# Patient Record
Sex: FEMALE | Race: WHITE | Employment: FULL TIME | URBAN - METROPOLITAN AREA
[De-identification: names, ages, dates, MRNs, and addresses within clinical notes are randomized per-mention and may not be internally consistent; named-entity substitution may affect disease eponyms.]

---

## 2019-10-21 ENCOUNTER — OFFICE VISIT (OUTPATIENT)
Dept: FAMILY MEDICINE CLINIC | Age: 39
End: 2019-10-21

## 2019-10-21 VITALS
BODY MASS INDEX: 57.52 KG/M2 | DIASTOLIC BLOOD PRESSURE: 87 MMHG | RESPIRATION RATE: 20 BRPM | TEMPERATURE: 98.5 F | HEIGHT: 60 IN | SYSTOLIC BLOOD PRESSURE: 135 MMHG | HEART RATE: 102 BPM | OXYGEN SATURATION: 96 % | WEIGHT: 293 LBS

## 2019-10-21 DIAGNOSIS — M62.838 MUSCLE SPASM OF LEFT SHOULDER: ICD-10-CM

## 2019-10-21 DIAGNOSIS — Z76.89 ENCOUNTER TO ESTABLISH CARE: Primary | ICD-10-CM

## 2019-10-21 RX ORDER — ALBUTEROL SULFATE 90 UG/1
AEROSOL, METERED RESPIRATORY (INHALATION)
COMMUNITY
End: 2021-11-04 | Stop reason: ALTCHOICE

## 2019-10-21 RX ORDER — BENZONATATE 100 MG/1
CAPSULE ORAL
COMMUNITY
Start: 2019-10-19 | End: 2021-11-04 | Stop reason: ALTCHOICE

## 2019-10-21 RX ORDER — IBUPROFEN 600 MG/1
TABLET ORAL
COMMUNITY
Start: 2019-10-19 | End: 2019-10-21 | Stop reason: ALTCHOICE

## 2019-10-21 RX ORDER — CYCLOBENZAPRINE HCL 10 MG
10 TABLET ORAL
Qty: 20 TAB | Refills: 0 | Status: SHIPPED | OUTPATIENT
Start: 2019-10-21 | End: 2021-11-04 | Stop reason: ALTCHOICE

## 2019-10-21 NOTE — PROGRESS NOTES
Assessment/Plan:     Diagnoses and all orders for this visit:    1. Encounter to establish care   -schedule CPE in the next several weeks. 2. Muscle spasm of left shoulder  -     REFERRAL TO PHYSICAL THERAPY  -     cyclobenzaprine (FLEXERIL) 10 mg tablet; Take 1 Tab by mouth three (3) times daily as needed for Muscle Spasm(s). - Unchanged, start Flexeril today as discussed, may try ibuprofen, referral to PT.  RTC if symptoms do not begin to improve. Discussed expected course/resolution/complications of diagnosis in detail with patient. Medication risks/benefits/costs/interactions/alternatives discussed with patient. Pt was given after visit summary which includes diagnoses, current medications & vitals. Pt expressed understanding with the diagnosis and plan        Subjective:      Mandy Fuentes is a 45 y.o. female who presents for had concerns including Establish Care. Here today to establish care. Neck Pain  Patient complains of neck pain. Onset of symptoms was 4 months ago, gradually worsening since that time. Current symptoms are numbness in left arm when having neck pain. Patient denies numbness, tingling, paresthesias in upper extremities. Patient denies weakness, diminished  strength, lack of coordination. Radiation of pain:  Event that precipitate these symptoms: none known. Patient has had no prior neck problems. Previous treatments include: none. Current Outpatient Medications   Medication Sig Dispense Refill    benzonatate (TESSALON) 100 mg capsule       albuterol (PROVENTIL HFA, VENTOLIN HFA, PROAIR HFA) 90 mcg/actuation inhaler Take  by inhalation.  cyclobenzaprine (FLEXERIL) 10 mg tablet Take 1 Tab by mouth three (3) times daily as needed for Muscle Spasm(s).  20 Tab 0       Allergies   Allergen Reactions    Pcn [Penicillins] Hives     Past Medical History:   Diagnosis Date    Kidney calculi      Past Surgical History:   Procedure Laterality Date    CYSTOSCOPY,INSERT URETERAL STENT  6.23.16    Dr. Jose Dial ([facility])     Family History   Problem Relation Age of Onset    Cancer Mother         Leukemia    Cancer Maternal Grandmother 80        colon cancer    Cancer Maternal Uncle         Lung Cancer     Social History     Socioeconomic History    Marital status: SINGLE     Spouse name: Not on file    Number of children: 0    Years of education: Not on file    Highest education level: Not on file   Occupational History    Occupation: Security   Social Needs    Financial resource strain: Not on file    Food insecurity:     Worry: Not on file     Inability: Not on file   OM[facility] needs:     Medical: Not on file     Non-medical: Not on file   Tobacco Use    Smoking status: Never Smoker    Smokeless tobacco: Never Used    Tobacco comment: boyfriend smokes in apartment   Substance and Sexual Activity    Alcohol use: No     Alcohol/week: 0.0 standard drinks    Drug use: No    Sexual activity: Yes     Partners: Male     Birth control/protection: None   Lifestyle    Physical activity:     Days per week: Not on file     Minutes per session: Not on file    Stress: Not on file   Relationships    Social connections:     Talks on phone: Not on file     Gets together: Not on file     Attends Judaism service: Not on file     Active member of club or organization: Not on file     Attends meetings of clubs or organizations: Not on file     Relationship status: Not on file    Intimate partner violence:     Fear of current or ex partner: Not on file     Emotionally abused: Not on file     Physically abused: Not on file     Forced sexual activity: Not on file   Other Topics Concern     Service Not Asked    Blood Transfusions Not Asked    Caffeine Concern Not Asked    Occupational Exposure Not Asked   Rosibel Blizzard Hazards Not Asked    Sleep Concern Not Asked    Stress Concern Not Asked    Weight Concern Not Asked    Special Diet Not Asked    Back Care Not Asked    Exercise No    Bike Helmet Not Asked   [address] [facility] Not Asked    Self-Exams Not Asked   Social History Narrative    Lives with boyfriend (12 years in 2016). No pets. HPI      ROS:   Review of Systems   Constitutional: Negative for chills, fever and malaise/fatigue. Respiratory: Positive for cough. Negative for shortness of breath. Cardiovascular: Negative for chest pain, palpitations and leg swelling. Musculoskeletal: Positive for neck pain. Objective:     Visit Vitals  /87 (BP 1 Location: Left arm, BP Patient Position: Sitting)   Pulse (!) 102   Temp 98.5 °F (36.9 °C) (Oral)   Resp 20   Ht 5' (1.524 m)   Wt 303 lb (137.4 kg)   LMP 10/04/2019   SpO2 96%   BMI 59.18 kg/m²         Vitals and Nurse Documentation reviewed. Physical Exam   Constitutional: She is oriented to person, place, and time and well-developed, well-nourished, and in no distress. Vital signs are normal. No distress. HENT:   Head: Normocephalic and atraumatic. Cardiovascular: Normal rate, regular rhythm and normal heart sounds. Exam reveals no gallop and no friction rub. No murmur heard. Pulmonary/Chest: Effort normal and breath sounds normal. No respiratory distress. She has no wheezes. She has no rales. Musculoskeletal:        Cervical back: She exhibits tenderness. Mild tenderness over left trapizeus area, mild decreased ROM due to pain. No swelling or erythema, weakness, no sensation deficit. Neurological: She is alert and oriented to person, place, and time. Skin: Skin is warm, dry and intact. She is not diaphoretic. No cyanosis. No pallor. Psychiatric: Affect normal. Her mood appears not anxious. She is not agitated. She does not exhibit a depressed mood. She expresses no homicidal and no suicidal ideation.        Results for orders placed or performed during the hospital encounter of 06/18/16   CULTURE, URINE   Result Value Ref Range    Special Requests: NO SPECIAL REQUESTS      [place] Count >100,000  COLONIES/mL        Culture result: ESCHERICHIA COLI         Susceptibility    Escherichia coli - AGUSTINA     Amikacin ($) <=16 Susceptible ug/mL     Ampicillin ($) >16 Resistant ug/mL     Ampicillin/sulbactam ($) <=8/4 Susceptible ug/mL     Aztreonam ($$$$) <=4 Susceptible ug/mL     Cefazolin ($) <=8 Susceptible ug/mL     Cefepime ($$) <=4 Susceptible ug/mL     Cefotaxime <=2 Susceptible ug/mL     Ceftazidime ($) <=1 Susceptible ug/mL     Ceftriaxone ($) <=1 Susceptible ug/mL     Cefuroxime ($) <=4 Susceptible ug/mL     Ciprofloxacin ($) <=1 Susceptible ug/mL     Gentamicin ($) <=4 Susceptible ug/mL     Imipenem <=1 Susceptible ug/mL     Levofloxacin ($) <=2 Susceptible ug/mL     Meropenem ($$) <=1 Susceptible ug/mL     Nitrofurantoin <=32 Susceptible ug/mL     Piperacillin/Tazobac ($) <=16 Susceptible ug/mL     Tobramycin ($) <=4 Susceptible ug/mL     Trimeth-Sulfamethoxa <=2/38 Susceptible ug/mL   CBC WITH AUTOMATED DIFF   Result Value Ref Range    WBC 13.8 (H) 3.6 - 11.0 K/uL    RBC 4.77 3.80 - 5.20 M/uL    HGB 14.3 11.5 - 16.0 g/dL    HCT 43.1 35.0 - 47.0 %    MCV 90.4 80.0 - 99.0 FL    MCH 30.0 26.0 - 34.0 PG    MCHC 33.2 30.0 - 36.5 g/dL    RDW 12.6 11.5 - 14.5 %    PLATELET 006 545 - 787 K/uL    NEUTROPHILS 75 32 - 75 %    LYMPHOCYTES 19 12 - 49 %    MONOCYTES 5 5 - 13 %    EOSINOPHILS 1 0 - 7 %    BASOPHILS 0 0 - 1 %    ABS. NEUTROPHILS 10.3 (H) 1.8 - 8.0 K/UL    ABS. LYMPHOCYTES 2.7 0.8 - 3.5 K/UL    ABS. MONOCYTES 0.7 0.0 - 1.0 K/UL    ABS. EOSINOPHILS 0.1 0.0 - 0.4 K/UL    ABS.  BASOPHILS 0.0 0.0 - 0.1 K/UL   METABOLIC PANEL, COMPREHENSIVE   Result Value Ref Range    Sodium 140 136 - 145 mmol/L    Potassium 3.4 (L) 3.5 - 5.1 mmol/L    Chloride 104 97 - 108 mmol/L    CO2 27 21 - 32 mmol/L    Anion gap 9 5 - 15 mmol/L    Glucose 112 (H) 65 - 100 mg/dL    BUN 12 6 - 20 MG/DL    Creatinine 0.72 0.55 - 1.02 MG/DL    BUN/Creatinine ratio 17 12 - 20      GFR est AA >60 >60 ml/min/1.73m2    GFR est non-AA >60 >60 ml/min/1.73m2    Calcium 9.2 8.5 - 10.1 MG/DL    Bilirubin, total 0.4 0.2 - 1.0 MG/DL    ALT (SGPT) 25 12 - 78 U/L    AST (SGOT) 12 (L) 15 - 37 U/L    Alk.  phosphatase 95 45 - 117 U/L    Protein, total 8.7 (H) 6.4 - 8.2 g/dL    Albumin 4.0 3.5 - 5.0 g/dL    Globulin 4.7 (H) 2.0 - 4.0 g/dL    A-G Ratio 0.9 (L) 1.1 - 2.2     URINALYSIS W/ REFLEX CULTURE   Result Value Ref Range    Color YELLOW/STRAW      Appearance CLOUDY (A) CLEAR      Specific gravity 1.014 1.003 - 1.030      pH (UA) 6.0 5.0 - 8.0      Protein TRACE (A) NEG mg/dL    Glucose NEGATIVE  NEG mg/dL    Ketone NEGATIVE  NEG mg/dL    Bilirubin NEGATIVE  NEG      Blood LARGE (A) NEG      Urobilinogen 0.2 0.2 - 1.0 EU/dL    Nitrites NEGATIVE  NEG      Leukocyte Esterase SMALL (A) NEG      WBC 10-20 0 - 4 /hpf    RBC 20-50 0 - 5 /hpf    Epithelial cells MANY (A) FEW /lpf    Bacteria 4+ (A) NEG /hpf    UA:UC IF INDICATED URINE CULTURE ORDERED (A) CNI     LIPASE   Result Value Ref Range    Lipase 60 (L) 73 - 393 U/L   HCG URINE, QL. - POC   Result Value Ref Range    Pregnancy test,urine (POC) NEGATIVE  NEG

## 2021-11-04 ENCOUNTER — OFFICE VISIT (OUTPATIENT)
Dept: FAMILY MEDICINE CLINIC | Age: 41
End: 2021-11-04
Payer: COMMERCIAL

## 2021-11-04 VITALS
HEART RATE: 82 BPM | DIASTOLIC BLOOD PRESSURE: 90 MMHG | RESPIRATION RATE: 16 BRPM | TEMPERATURE: 97.3 F | SYSTOLIC BLOOD PRESSURE: 140 MMHG | OXYGEN SATURATION: 96 % | BODY MASS INDEX: 54.06 KG/M2 | HEIGHT: 60 IN | WEIGHT: 275.38 LBS

## 2021-11-04 DIAGNOSIS — Z12.31 ENCOUNTER FOR SCREENING MAMMOGRAM FOR MALIGNANT NEOPLASM OF BREAST: ICD-10-CM

## 2021-11-04 DIAGNOSIS — Z76.89 ENCOUNTER TO ESTABLISH CARE: ICD-10-CM

## 2021-11-04 DIAGNOSIS — Z28.20 VACCINE REFUSED BY PATIENT: ICD-10-CM

## 2021-11-04 DIAGNOSIS — R13.19 ESOPHAGEAL DYSPHAGIA: ICD-10-CM

## 2021-11-04 DIAGNOSIS — R03.0 ELEVATED BLOOD PRESSURE READING WITHOUT DIAGNOSIS OF HYPERTENSION: ICD-10-CM

## 2021-11-04 DIAGNOSIS — E66.01 MORBID OBESITY DUE TO EXCESS CALORIES (HCC): ICD-10-CM

## 2021-11-04 DIAGNOSIS — K21.9 GASTROESOPHAGEAL REFLUX DISEASE, UNSPECIFIED WHETHER ESOPHAGITIS PRESENT: Primary | ICD-10-CM

## 2021-11-04 PROCEDURE — 99204 OFFICE O/P NEW MOD 45 MIN: CPT | Performed by: NURSE PRACTITIONER

## 2021-11-04 RX ORDER — PANTOPRAZOLE SODIUM 40 MG/1
40 TABLET, DELAYED RELEASE ORAL DAILY
Qty: 90 TABLET | Refills: 0 | Status: SHIPPED | OUTPATIENT
Start: 2021-11-04 | End: 2022-01-05 | Stop reason: SDUPTHER

## 2021-11-04 NOTE — PROGRESS NOTES
Chief Complaint   Patient presents with    Other     throat/swallowing issue   1. Have you been to the ER, urgent care clinic since your last visit? Hospitalized since your last visit? Yes Reason for visit: CJW, bronchitis 2019    2. Have you seen or consulted any other health care providers outside of the [address] [place] [address] since your last visit? Include any pap smears or colon screening.  No   3 most recent PHQ Screens 11/4/2021   Little interest or pleasure in doing things Not at all   Feeling down, depressed, irritable, or hopeless Not at all   Total Score PHQ 2 0     Visit Vitals  BP (!) 140/90 (BP 1 Location: Right upper arm, BP Patient Position: Sitting)   Pulse 82   Temp 97.3 °F (36.3 °C) (Temporal)   Resp 16   Ht 5' (1.524 m)   Wt 275 lb 6 oz (124.9 kg)   SpO2 96%   BMI 53.78 kg/m²

## 2021-11-04 NOTE — PROGRESS NOTES
Subjective  Chief Complaint   Patient presents with    Other     throat/swallowing issue     HPI:  Selina Bills is a 36 y.o. female. This is a new patient to the practice. Presents to discuss gag reflux. Reports sensitive gag reflux in the past. Now reports difficulty swallowing. Symptoms include sensation of ball in her throat, cough with eating, food stuck in esophagus, reflux, regurgitation, and heartburn. High fat and greasy foods are triggers for reflux and heartburn. OTC Prilosec daily for 2 weeks did not improve symptoms. Follows with health and , Sandrine Partida, who is prescribing progesterone for irregular cycles, and helping with weight loss. Weight is down 35 pounds over the past year. Goal weight is under 200. Exercises at least 3 days per week with  and walks daily during lunch break.     Past Medical History:   Diagnosis Date    Elevated blood pressure reading     High cholesterol     Irregular periods/menstrual cycles     Kidney stone      Family History   Problem Relation Age of Onset    Cancer Mother         Leukemia    High Cholesterol Father     Cancer Maternal Grandmother 80        colon cancer    Cancer Maternal Uncle         Lung Cancer    High Cholesterol Brother     COPD Paternal Grandfather     Alzheimer's Disease Maternal Grandfather      Social History     Socioeconomic History    Marital status: SINGLE     Spouse name: Not on file    Number of children: 0    Years of education: Not on file    Highest education level: Not on file   Occupational History    Occupation: Security   Tobacco Use    Smoking status: Never Smoker    Smokeless tobacco: Never Used    Tobacco comment: boyfriend smokes in apartment   Vaping Use    Vaping Use: Never used   Substance and Sexual Activity    Alcohol use: Yes     Comment: rarely    Drug use: No    Sexual activity: Yes     Partners: Male     Birth control/protection: None   Other Topics Concern     Service Not Asked    Blood Transfusions Not Asked    Caffeine Concern Not Asked    Occupational Exposure Not Asked    Hobby Hazards Not Asked    Sleep Concern Not Asked    Stress Concern Not Asked    Weight Concern Not Asked    Special Diet Not Asked    Back Care Not Asked    Exercise No    Bike Helmet Not Asked   [address] Not Asked    Self-Exams Not Asked   Social History Narrative    Lives with boyfriend (12 years in 2016). No pets. Social Determinants of Health     Financial Resource Strain:     Difficulty of Paying Living Expenses: Not on file   Food Insecurity:     Worried About Running Out of Food in the Last Year: Not on file    Brielle of Food in the Last Year: Not on file   Transportation Needs:     Lack of Transportation (Medical): Not on file    Lack of Transportation (Non-Medical): Not on file   Physical Activity:     Days of Exercise per Week: Not on file    Minutes of Exercise per Session: Not on file   Stress:     Feeling of Stress : Not on file   Social Connections:     Frequency of Communication with Friends and Family: Not on file    Frequency of Social Gatherings with Friends and Family: Not on file    Attends Denominational Services: Not on file    Active Member of [address] [place] [address] [email or web address] or Organizations: Not on file    Attends Club or Organization Meetings: Not on file    Marital Status: Not on file   Intimate Partner Violence:     Fear of Current or Ex-Partner: Not on file    Emotionally Abused: Not on file    Physically Abused: Not on file    Sexually Abused: Not on file   Housing Stability:     Unable to Pay for Housing in the Last Year: Not on file    Number of [place] in the Last Year: Not on file    Unstable Housing in the Last Year: Not on file     Current Outpatient Medications on File Prior to Visit   Medication Sig Dispense Refill    PROGESTERONE 250 Capsules by Does Not Apply route.       [DISCONTINUED] benzonatate (TESSALON) 100 mg capsule (Patient not taking: Reported on 11/4/2021)      [DISCONTINUED] albuterol (PROVENTIL HFA, VENTOLIN HFA, PROAIR HFA) 90 mcg/actuation inhaler Take  by inhalation. (Patient not taking: Reported on 11/4/2021)      [DISCONTINUED] cyclobenzaprine (FLEXERIL) 10 mg tablet Take 1 Tab by mouth three (3) times daily as needed for Muscle Spasm(s). (Patient not taking: Reported on 11/4/2021) 20 Tab 0     No current facility-administered medications on file prior to visit. Allergies   Allergen Reactions    Pcn [Penicillins] Hives     ROS  See HPI for pertinent ROS. Objective  Visit Vitals  BP (!) 140/90 (BP 1 Location: Right upper arm, BP Patient Position: Sitting)   Pulse 82   Temp 97.3 °F (36.3 °C) (Temporal)   Resp 16   Ht 5' (1.524 m)   Wt 275 lb 6 oz (124.9 kg)   SpO2 96%   BMI 53.78 kg/m²       Physical Exam  Vitals and nursing note reviewed. Constitutional:       General: She is not in acute distress. Appearance: Normal appearance. HENT:      Head: Normocephalic. Eyes:      Extraocular Movements: Extraocular movements intact. Cardiovascular:      Rate and Rhythm: Normal rate and regular rhythm. Heart sounds: Normal heart sounds. Pulmonary:      Effort: Pulmonary effort is normal.      Breath sounds: Normal breath sounds. Abdominal:      General: Bowel sounds are normal.      Palpations: Abdomen is soft. Tenderness: There is abdominal tenderness in the epigastric area. Musculoskeletal:         General: Normal range of motion. Right lower leg: No edema. Left lower leg: No edema. Skin:     General: Skin is warm and dry. Neurological:      Mental Status: She is alert and oriented to person, place, and time. Psychiatric:         Mood and Affect: Mood normal.         Behavior: Behavior normal.          Assessment & Plan      ICD-10-CM ICD-9-CM    1.  Gastroesophageal reflux disease, unspecified whether esophagitis present  K21.9 530.81 pantoprazole (PROTONIX) 40 mg tablet 2. Esophageal dysphagia  R13.19 787.29    3. Morbid obesity due to excess calories ([facility])  E66.01 278.01    4. Encounter for screening mammogram for malignant neoplasm of breast  Z12.31 V76.12 [facility] MAMMO BI SCREENING INCL CAD   5. Elevated blood pressure reading without diagnosis of hypertension  R03.0 796.2    6. Vaccine refused by patient  Z28.20 V64.09    7. Encounter to establish care  Z76.89 V65.8      Diagnoses and all orders for this visit:    1. Gastroesophageal reflux disease, unspecified whether esophagitis present  Medication as ordered-we discussed she may benefit from a longer course of higher dose of PPI. Understands we will decrease dose to 20 mg which is a more appropriate maintenance dose with the next refill if her symptoms resolved. We also reviewed pharmacological and non pharmacological management of GERD including weight loss, common triggers etc.   -     pantoprazole (PROTONIX) 40 mg tablet; Take 1 Tablet by mouth daily. 2. Esophageal dysphagia  Possibly related to uncontrolled reflux. She declines GI referral today but is agreeable if symptoms do not resolve over the next 1-2 months. Encouraged to eat smaller bites of food, chew food thoroughly, and takes sips of water if needed while eating. 3. Morbid obesity due to excess calories ([address]  Follows with wellness and  for weight loss. Continue to work on improving diet and increasing exercise. 4. Encounter for screening mammogram for malignant neoplasm of breast  -     [facility] MAMMO BI SCREENING INCL CAD; Future    5. Elevated blood pressure reading without diagnosis of hypertension  BP is above goal in the office today. She is going to check readings outside the office after sitting quietly for 5 minutes with both feet flat on the floor and arm at heart level. Instructed to call if staying consistently greater than 140/90 on either number and bring readings to f/u appointment in two months.  In the meantime, stay hydrated, lower salt in diet and increase exercise. 6. Vaccine refused by patient  Declines flu vaccine. 7. Encounter to establish care      Follow-up and Dispositions    · Return in about 2 months (around 1/4/2022), or if symptoms worsen or fail to improve, for wellness, fasting labs, follow up 11/4/2021.            Guzman Rodriguez, NP

## 2021-11-04 NOTE — PATIENT INSTRUCTIONS
Gastroesophageal Reflux Disease (GERD): Care Instructions  Overview     Gastroesophageal reflux disease (GERD) is the backward flow of stomach acid into the esophagus. The esophagus is the tube that leads from your throat to your stomach. A one-way valve prevents the stomach acid from backing up into this tube. But when you have GERD, this valve does not close tightly enough. This can also cause pain and swelling in your esophagus. (This is called esophagitis.)  If you have mild GERD symptoms including heartburn, you may be able to control the problem with antacids or over-the-counter medicine. You can also make lifestyle changes to help reduce your symptoms. These include changing your diet and eating habits, such as not eating late at night and losing weight. Follow-up care is a key part of your treatment and safety. Be sure to make and go to all appointments, and call your doctor if you are having problems. It's also a good idea to know your test results and keep a list of the medicines you take. How can you care for yourself at home? · Take your medicines exactly as prescribed. Call your doctor if you think you are having a problem with your medicine. · Your doctor may recommend over-the-counter medicine. For mild or occasional indigestion, antacids, such as Tums, Gaviscon, Mylanta, or Maalox, may help. Your doctor also may recommend over-the-counter acid reducers, such as famotidine (Pepcid AC), cimetidine (Tagamet HB), or omeprazole (Prilosec). Read and follow all instructions on the label. If you use these medicines often, talk with your doctor. · Change your eating habits. ? It's best to eat several small meals instead of two or three large meals. ? After you eat, wait 2 to 3 hours before you lie down. ? Chocolate, mint, and alcohol can make GERD worse. ? Spicy foods, foods that have a lot of acid (like tomatoes and oranges), and coffee can make GERD symptoms worse in some people.  If your symptoms are worse after you eat a certain food, you may want to stop eating that food to see if your symptoms get better. · Do not smoke or chew tobacco. Smoking can make GERD worse. If you need help quitting, talk to your doctor about stop-smoking programs and medicines. These can increase your chances of quitting for good. · If you have GERD symptoms at night, raise the head of your bed 6 to 8 inches by putting the frame on blocks or placing a foam wedge under the head of your mattress. (Adding extra pillows does not work.)  · Do not wear tight clothing around your middle. · Lose weight if you need to. Losing just 5 to 10 pounds can help. When should you call for help? Call your doctor now or seek immediate medical care if:    · You have new or different belly pain.     · Your stools are black and tarlike or have streaks of blood. Watch closely for changes in your health, and be sure to contact your doctor if:    · Your symptoms have not improved after 2 days.     · Food seems to catch in your throat or chest.   Where can you learn more? Go to http://www.gray.com/  Enter O112 in the search box to learn more about \"Gastroesophageal Reflux Disease (GERD): Care Instructions. \"  Current as of: February 10, 2021               Content Version: 13.0  © 2006-2021 [employer]. Care instructions adapted under license by [employer] (which disclaims liability or warranty for this information). If you have questions about a medical condition or this instruction, always ask your healthcare professional. [place] [address] any warranty or liability for your use of this information. Home Blood Pressure Test: About This Test  What is it? A home blood pressure test allows you to keep track of your blood pressure at home. Blood pressure is a measure of the force of blood against the walls of your arteries.  Blood pressure readings include two numbers, such as 130/80 (say \"130 over 80\"). The first number is the systolic pressure. The second number is the diastolic pressure. Why is this test done? You may do this test at home to:  · Find out if you have high blood pressure. · Track your blood pressure if you have high blood pressure. · Track how well medicine is working to reduce high blood pressure. · Check how lifestyle changes, such as weight loss and exercise, are affecting blood pressure. How do you prepare for the test?  For at least 30 minutes before you take your blood pressure, don't exercise, drink caffeine, or smoke. Empty your bladder before the test. Sit quietly with your back straight and both feet on the floor for at least 5 minutes. This helps you take your blood pressure while you feel comfortable and relaxed. How is the test done? · If your doctor recommends it, take your blood pressure twice a day. Take it in the morning and evening. · Sit with your arm slightly bent and resting on a table so that your upper arm is at the same level as your heart. · Use the same arm each time you take your blood pressure. · Place the blood pressure cuff on the bare skin of your upper arm. You may have to roll up your sleeve, remove your arm from the sleeve, or take your shirt off. · Wrap the blood pressure cuff around your upper arm so that the lower edge of the cuff is about 1 inch above the bend of your elbow. · Do not move, talk, or text while you take your blood pressure. Follow the instructions that came with your blood pressure monitor. They might be different from the following. · Press the on/off button on the automatic monitor. Then you may need to wait until the screen says the monitor is ready. · Press the start button. The cuff will inflate and deflate by itself. · Your blood pressure numbers will appear on the screen. · Wait one minute and take your blood pressure again.   · If your monitor does not automatically save your numbers, write them in your log book, along with the date and time. Follow-up care is a key part of your treatment and safety. Be sure to make and go to all appointments, and call your doctor if you are having problems. It's also a good idea to keep a list of the medicines you take. Where can you learn more? Go to http://www.gonzalez.com/  Enter C427 in the search box to learn more about \"Home Blood Pressure Test: About This Test.\"  Current as of: April 29, 2021               Content Version: 13.0  © 3025-1251 Healthwise, Incorporated. Care instructions adapted under license by [employer] (which disclaims liability or warranty for this information). If you have questions about a medical condition or this instruction, always ask your healthcare professional. [address] any warranty or liability for your use of this information.

## 2021-11-17 ENCOUNTER — HOSPITAL ENCOUNTER (OUTPATIENT)
Dept: MAMMOGRAPHY | Age: 41
Discharge: HOME | End: 2021-11-17
Payer: COMMERCIAL

## 2021-11-17 DIAGNOSIS — Z12.31 ENCOUNTER FOR SCREENING MAMMOGRAM FOR MALIGNANT NEOPLASM OF BREAST: ICD-10-CM

## 2021-11-17 PROCEDURE — 77067 SCR MAMMO BI INCL CAD: CPT

## 2022-01-05 ENCOUNTER — OFFICE VISIT (OUTPATIENT)
Dept: FAMILY MEDICINE CLINIC | Age: 42
End: 2022-01-05
Payer: COMMERCIAL

## 2022-01-05 VITALS
DIASTOLIC BLOOD PRESSURE: 94 MMHG | WEIGHT: 286.13 LBS | HEART RATE: 94 BPM | BODY MASS INDEX: 56.18 KG/M2 | OXYGEN SATURATION: 97 % | HEIGHT: 60 IN | SYSTOLIC BLOOD PRESSURE: 169 MMHG | TEMPERATURE: 97.7 F | RESPIRATION RATE: 16 BRPM

## 2022-01-05 DIAGNOSIS — Z11.59 ENCOUNTER FOR HEPATITIS C SCREENING TEST FOR LOW RISK PATIENT: ICD-10-CM

## 2022-01-05 DIAGNOSIS — Z00.00 WELLNESS EXAMINATION: Primary | ICD-10-CM

## 2022-01-05 DIAGNOSIS — F33.8 SEASONAL DEPRESSION (HCC): ICD-10-CM

## 2022-01-05 DIAGNOSIS — Z13.220 SCREENING FOR HYPERLIPIDEMIA: ICD-10-CM

## 2022-01-05 DIAGNOSIS — I10 PRIMARY HYPERTENSION: ICD-10-CM

## 2022-01-05 DIAGNOSIS — Z28.20 VACCINE REFUSED BY PATIENT: ICD-10-CM

## 2022-01-05 DIAGNOSIS — R13.14 PHARYNGOESOPHAGEAL DYSPHAGIA: ICD-10-CM

## 2022-01-05 DIAGNOSIS — K21.9 GASTROESOPHAGEAL REFLUX DISEASE, UNSPECIFIED WHETHER ESOPHAGITIS PRESENT: ICD-10-CM

## 2022-01-05 DIAGNOSIS — Z13.31 POSITIVE DEPRESSION SCREENING: ICD-10-CM

## 2022-01-05 DIAGNOSIS — E66.01 MORBID OBESITY DUE TO EXCESS CALORIES (HCC): ICD-10-CM

## 2022-01-05 PROCEDURE — 99214 OFFICE O/P EST MOD 30 MIN: CPT | Performed by: NURSE PRACTITIONER

## 2022-01-05 PROCEDURE — 99396 PREV VISIT EST AGE 40-64: CPT | Performed by: NURSE PRACTITIONER

## 2022-01-05 RX ORDER — CHLORTHALIDONE 25 MG/1
TABLET ORAL
Qty: 15 TABLET | Refills: 0 | Status: SHIPPED | OUTPATIENT
Start: 2022-01-05 | End: 2022-02-08 | Stop reason: SDUPTHER

## 2022-01-05 RX ORDER — PANTOPRAZOLE SODIUM 20 MG/1
20 TABLET, DELAYED RELEASE ORAL DAILY
Qty: 90 TABLET | Refills: 0 | Status: SHIPPED | OUTPATIENT
Start: 2022-01-05 | End: 2022-05-11 | Stop reason: ALTCHOICE

## 2022-01-05 NOTE — PROGRESS NOTES
Chief Complaint   Patient presents with   [facility] Annual Wellness Visit   1. Have you been to the ER, urgent care clinic since your last visit? Hospitalized since your last visit? No    2. Have you seen or consulted any other health care providers outside of the [address] [place] [address] since your last visit? Include any pap smears or colon screening.  No   3 most recent PHQ Screens 1/5/2022   Little interest or pleasure in doing things Several days   Feeling down, depressed, irritable, or hopeless Nearly every day   Total Score PHQ 2 4   Trouble falling or staying asleep, or sleeping too much Nearly every day   Feeling tired or having little energy Nearly every day   Poor appetite, weight loss, or overeating More than half the days   Feeling bad about yourself - or that you are a failure or have let yourself or your family down Several days   Trouble concentrating on things such as school, work, reading, or watching TV More than half the days   Moving or speaking so slowly that other people could have noticed; or the opposite being so fidgety that others notice Several days   Thoughts of being better off dead, or hurting yourself in some way Not at all   PHQ 9 Score 16   How difficult have these problems made it for you to do your work, take care of your home and get along with others Somewhat difficult     Visit Vitals  BP (!) 169/94 (BP 1 Location: Right upper arm, BP Patient Position: Sitting, BP Cuff Size: Large adult)   Pulse 94   Temp 97.7 °F (36.5 °C) (Temporal)   Resp 16   Ht 5' (1.524 m)   Wt 286 lb 2 oz (129.8 kg)   SpO2 97%   BMI 55.88 kg/m²

## 2022-01-05 NOTE — PATIENT INSTRUCTIONS
Recovering From Depression: Care Instructions  Your Care Instructions     Taking good care of yourself is important as you recover from depression. In time, your symptoms will fade as your treatment takes hold. Do not give up. Instead, focus your energy on getting better. Your mood will improve. It just takes some time. Focus on things that can help you feel better, such as being with friends and family, eating well, and getting enough rest. But take things slowly. Do not do too much too soon. You will begin to feel better gradually. Follow-up care is a key part of your treatment and safety. Be sure to make and go to all appointments, and call your doctor if you are having problems. It's also a good idea to know your test results and keep a list of the medicines you take. How can you care for yourself at home? Be realistic  · If you have a large task to do, break it up into smaller steps you can handle, and just do what you can. · You may want to put off important decisions until your depression has lifted. If you have plans that will have a major impact on your life, such as marriage, divorce, or a job change, try to wait a bit. Talk it over with friends and loved ones who can help you look at the overall picture first.  · Reaching out to people for help is important. Do not isolate yourself. Let your family and friends help you. Find someone you can trust and confide in, and talk to that person. · Be patient, and be kind to yourself. Remember that depression is not your fault and is not something you can overcome with willpower alone. Treatment is important for depression, just like for any other illness. Feeling better takes time, and your mood will improve little by little. Stay active  · Stay busy and get outside. Take a walk, or try some other light exercise. · Talk with your doctor about an exercise program. Exercise can help with mild depression. · Go to a movie or concert.  Take part in a Anglican activity or other social gathering. Go to a [employer] game. · Ask a friend to have dinner with you. Take care of yourself  · Eat a balanced diet with plenty of fresh fruits and vegetables, whole grains, and lean protein. If you have lost your appetite, eat small snacks rather than large meals. · Avoid using illegal drugs or marijuana and drinking alcohol. Do not take medicines that have not been prescribed for you. They may interfere with medicines you may be taking for depression, or they may make your depression worse. · Take your medicines exactly as they are prescribed. You may start to feel better within 1 to 3 weeks of taking antidepressant medicine. But it can take as many as 6 to 8 weeks to see more improvement. If you have questions or concerns about your medicines, or if you do not notice any improvement by 3 weeks, talk to your doctor. · Continue to take your medicine after your symptoms improve. Taking your medicine for at least 6 months after you feel better can help keep you from getting depressed again. If this isn't the first time you have been depressed, your doctor may recommend you to take medicine even longer. · If you have any side effects from your medicine, tell your doctor. Many side effects are mild and will go away on their own after you have been taking the medicine for a few weeks. Some may last longer. Talk to your doctor if side effects are bothering you too much. You might be able to try a different medicine. · Continue counseling. It may help prevent depression from returning, especially if you've had multiple episodes of depression. Talk with your counselor if you are having a hard time attending your sessions or you think the sessions aren't working. Don't just stop going. · Get enough sleep. Talk to your doctor if you are having problems sleeping. · Avoid sleeping pills unless they are prescribed by the doctor treating your depression.  Sleeping pills may make you groggy during the day, and they may interact with other medicine you are taking. · If you have any other illnesses, such as diabetes, heart disease, or high blood pressure, make sure to continue with your treatment. Tell your doctor about all of the medicines you take, including those with or without a prescription. · If you or someone you know talks about suicide, self-harm, or feeling hopeless, get help right away. Call the [address] [place] [address] at [phone]-AMDP ([phone]) or text HOME to 428960 to access the Crisis Text Line. Consider saving these numbers in your phone. When should you call for help? Call 911 anytime you think you may need emergency care. For example, call if:    · You feel like hurting yourself or someone else.     · Someone you know has depression and is about to attempt or is attempting suicide. Call your doctor now or seek immediate medical care if:    · You hear voices.     · Someone you know has depression and:  ? Starts to give away his or her possessions. ? Uses illegal drugs or drinks alcohol heavily. ? Talks or writes about death, including writing suicide notes or talking about guns, knives, or pills. ? Starts to spend a lot of time alone. ? Acts very aggressively or suddenly appears calm. Watch closely for changes in your health, and be sure to contact your doctor if:    · You do not get better as expected. Where can you learn more? Go to http://www.gray.com/  Enter N529 in the search box to learn more about \"Recovering From Depression: Care Instructions. \"  Current as of: June 16, 2021               Content Version: 13.0  © 2384-3535 Healthwise, Incorporated. Care instructions adapted under license by [employer] (which disclaims liability or warranty for this information).  If you have questions about a medical condition or this instruction, always ask your healthcare professional. [address] any warranty or liability for your use of this information. Well Visit, Ages 25 to 48: Care Instructions  Overview     Well visits can help you stay healthy. Your doctor has checked your overall health and may have suggested ways to take good care of yourself. Your doctor also may have recommended tests. At home, you can help prevent illness with healthy eating, regular exercise, and other steps. Follow-up care is a key part of your treatment and safety. Be sure to make and go to all appointments, and call your doctor if you are having problems. It's also a good idea to know your test results and keep a list of the medicines you take. How can you care for yourself at home? · Get screening tests that you and your doctor decide on. Screening helps find diseases before any symptoms appear. · Eat healthy foods. Choose fruits, vegetables, whole grains, protein, and low-fat dairy foods. Limit fat, especially saturated fat. Reduce salt in your diet. · Limit alcohol. If you are a man, have no more than 2 drinks a day or 14 drinks a week. If you are a woman, have no more than 1 drink a day or 7 drinks a week. · Get at least 30 minutes of physical activity on most days of the week. Walking is a good choice. You also may want to do other activities, such as running, swimming, cycling, or playing tennis or team sports. Discuss any changes in your exercise program with your doctor. · Reach and stay at a healthy weight. This will lower your risk for many problems, such as obesity, diabetes, heart disease, and high blood pressure. · Do not smoke or allow others to smoke around you. If you need help quitting, talk to your doctor about stop-smoking programs and medicines. These can increase your chances of quitting for good. · Care for your mental health. It is easy to get weighed down by worry and stress.  Learn strategies to manage stress, like deep breathing and mindfulness, and stay connected with your family and community. If you find you often feel sad or hopeless, talk with your doctor. Treatment can help. · Talk to your doctor about whether you have any risk factors for sexually transmitted infections (STIs). You can help prevent STIs if you wait to have sex with a new partner (or partners) until you've each been tested for STIs. It also helps if you use condoms (male or female condoms) and if you limit your sex partners to one person who only has sex with you. Vaccines are available for some STIs, such as HPV. · Use birth control if it's important to you to prevent pregnancy. Talk with your doctor about the choices available and what might be best for you. · If you think you may have a problem with alcohol or drug use, talk to your doctor. This includes prescription medicines (such as amphetamines and opioids) and illegal drugs (such as cocaine and methamphetamine). Your doctor can help you figure out what type of treatment is best for you. · Protect your skin from too much sun. When you're outdoors from 10 a.m. to 4 p.m., stay in the shade or cover up with clothing and a hat with a wide brim. Wear sunglasses that block UV rays. Even when it's cloudy, put broad-spectrum sunscreen (SPF 30 or higher) on any exposed skin. · See a dentist one or two times a year for checkups and to have your teeth cleaned. · Wear a seat belt in the car. When should you call for help? Watch closely for changes in your health, and be sure to contact your doctor if you have any problems or symptoms that concern you. Where can you learn more? Go to http://www.[employer].com/  Enter P072 in the search box to learn more about \"Well Visit, Ages 25 to 48: Care Instructions. \"  Current as of: February 11, 2021               Content Version: 13.0  © 7857-7892 Healthwise, Incorporated. Care instructions adapted under license by [employer] (which disclaims liability or warranty for this information).  If you have questions about a medical condition or this instruction, always ask your healthcare professional. [place] [address] any warranty or liability for your use of this information.

## 2022-01-05 NOTE — PROGRESS NOTES
Subjective  Chief Complaint   Patient presents with    Annual Wellness Visit     HPI:  Deandra Dc is a 39 y.o. female. Patient presents for wellness, fasting labs, and management of GERD and elevated blood pressure. Understands this is considered two appointments and there may be a copay for the follow up portion of the visit. Home BP log reviewed, readings are labile and consistently >801 systolic and 57-78W diastolic with a few readings >90. Heartburn has resolved with Pantoprazole. Reports ongoing sensation of ball in throat and occasional difficulty swallowing. Working to avoid triggers and drinks mostly water. Continues to follow with weight loss specialist and working on positive lifestyle changes- trying to eat healthier and exercise 3 times per week.     Immunizations:  Flu: declines  COVID: complete  Tetanus: declines    HCV screening: ordered  LMP: current  Pap: 2019  Mammo: 11/17/2021  Smoking status: never    Moods: seasonal depression  PHQ2: positive attributed to anniversary of mothers death and holidays, declines meds today  Diet: working hard to eat healthy  Exercise: 3 times per week  Vision exams: overdue  Dental exams: overdue      Past Medical History:   Diagnosis Date    Elevated blood pressure reading     High cholesterol     Irregular periods/menstrual cycles     Kidney stone      Family History   Problem Relation Age of Onset    Cancer Mother         Leukemia    High Cholesterol Father     Cancer Maternal Grandmother 80        colon cancer    Cancer Maternal Uncle         Lung Cancer    High Cholesterol Brother     COPD Paternal Grandfather     Alzheimer's Disease Maternal Grandfather      Social History     Socioeconomic History    Marital status: SINGLE     Spouse name: Not on file    Number of children: 0    Years of education: Not on file    Highest education level: Not on file   Occupational History    Occupation: Security   Tobacco Use    Smoking status: Never Smoker    Smokeless tobacco: Never Used    Tobacco comment: boyfriend smokes in apartment   Vaping Use    Vaping Use: Never used   Substance and Sexual Activity    Alcohol use: Yes     Comment: rarely    Drug use: No    Sexual activity: Yes     Partners: Male     Birth control/protection: None   Other Topics Concern     Service Not Asked    Blood Transfusions Not Asked    Caffeine Concern Not Asked    Occupational Exposure Not Asked    Hobby Hazards Not Asked    Sleep Concern Not Asked    Stress Concern Not Asked    Weight Concern Not Asked    Special Diet Not Asked    Back Care Not Asked    Exercise No    Bike Helmet Not Asked   [address] Not Asked    Self-Exams Not Asked   Social History Narrative    Lives with boyfriend (12 years in 2016). No pets. Social Determinants of Health     Financial Resource Strain:     Difficulty of Paying Living Expenses: Not on file   Food Insecurity:     Worried About Running Out of Food in the Last Year: Not on file    Brielle of Food in the Last Year: Not on file   Transportation Needs:     Lack of Transportation (Medical): Not on file    Lack of Transportation (Non-Medical):  Not on file   Physical Activity:     Days of Exercise per Week: Not on file    Minutes of Exercise per Session: Not on file   Stress:     Feeling of Stress : Not on file   Social Connections:     Frequency of Communication with Friends and Family: Not on file    Frequency of Social Gatherings with Friends and Family: Not on file    Attends Zoroastrian Services: Not on file    Active Member of Clubs or Organizations: Not on file    Attends Club or Organization Meetings: Not on file    Marital Status: Not on file   Intimate Partner Violence:     Fear of Current or Ex-Partner: Not on file    Emotionally Abused: Not on file    Physically Abused: Not on file    Sexually Abused: Not on file   Housing Stability:     Unable to Pay for Housing in the Last Year: Not on file    Number of Places Lived in the Last Year: Not on file    Unstable Housing in the Last Year: Not on file     Current Outpatient Medications on File Prior to Visit   Medication Sig Dispense Refill    PROGESTERONE 250 Capsules by Does Not Apply route.  [DISCONTINUED] pantoprazole (PROTONIX) 40 mg tablet Take 1 Tablet by mouth daily. 90 Tablet 0     No current facility-administered medications on file prior to visit. Allergies   Allergen Reactions    Pcn [Penicillins] Hives     Review of Systems   Constitutional: Negative for chills, fever and weight loss. HENT: Negative for congestion, ear pain, hearing loss, sinus pain and sore throat. Eyes: Negative for blurred vision. Respiratory: Negative for cough, shortness of breath and wheezing. Cardiovascular: Negative for chest pain, palpitations and leg swelling. Gastrointestinal: Negative for abdominal pain, constipation, diarrhea and heartburn. Genitourinary: Negative for dysuria. Musculoskeletal: Negative for joint pain and myalgias. Neurological: Negative for dizziness, tingling, weakness and headaches. Psychiatric/Behavioral: Negative for depression. The patient is not nervous/anxious. Objective  Visit Vitals  BP (!) 169/94 (BP 1 Location: Right upper arm, BP Patient Position: Sitting, BP Cuff Size: Large adult)   Pulse 94   Temp 97.7 °F (36.5 °C) (Temporal)   Resp 16   Ht 5' (1.524 m)   Wt 286 lb 2 oz (129.8 kg)   SpO2 97%   BMI 55.88 kg/m²       Physical Exam  Vitals and nursing note reviewed. Constitutional:       General: She is not in acute distress. Appearance: Normal appearance. She is morbidly obese. HENT:      Head: Normocephalic. Mouth/Throat:      Pharynx: No posterior oropharyngeal erythema. Eyes:      Extraocular Movements: Extraocular movements intact. Neck:      Thyroid: No thyroid mass, thyromegaly or thyroid tenderness.    Cardiovascular:      Rate and Rhythm: Normal rate and regular rhythm. Heart sounds: Normal heart sounds. Pulmonary:      Effort: Pulmonary effort is normal.      Breath sounds: Normal breath sounds. Chest:   Breasts:      Right: No supraclavicular adenopathy. Left: No supraclavicular adenopathy. Abdominal:      General: Bowel sounds are normal.      Palpations: Abdomen is soft. There is no mass. Tenderness: There is no abdominal tenderness. Comments: Limited by habitus   Musculoskeletal:         General: Normal range of motion. Cervical back: Normal range of motion and neck supple. Right lower leg: No edema. Left lower leg: No edema. Lymphadenopathy:      Cervical: No cervical adenopathy. Upper Body:      Right upper body: No supraclavicular adenopathy. Left upper body: No supraclavicular adenopathy. Skin:     General: Skin is warm and dry. Neurological:      General: No focal deficit present. Mental Status: She is alert and oriented to person, place, and time. Psychiatric:         Mood and Affect: Mood normal.         Behavior: Behavior normal.         Thought Content: Thought content normal.         Judgment: Judgment normal.          Assessment & Plan      ICD-10-CM ICD-9-CM    1. Wellness examination  Z00.00 V70.0    2. Screening for hyperlipidemia  Z13.220 V77.91 CBC WITH AUTOMATED DIFF      LIPID PANEL      METABOLIC PANEL, COMPREHENSIVE   3. Encounter for hepatitis C screening test for low risk patient  Z11.59 V73.89 HCV AB W/RFLX TO CHRISTELLE   4. Morbid obesity due to excess calories (HCC)  E66.01 278.01 TSH RFX ON ABNORMAL TO FREE T4   5. Vaccine refused by patient  Z28.20 V64.09    6. Positive depression screening  Z13.31 796.4 TSH RFX ON ABNORMAL TO FREE T4   7. Seasonal depression ([facility] [address]  F33.8 296.99 TSH RFX ON ABNORMAL TO FREE T4   8.  Gastroesophageal reflux disease, unspecified whether esophagitis present  K21.9 530.81 pantoprazole (PROTONIX) 20 mg tablet      REFERRAL TO GASTROENTEROLOGY   9. Primary hypertension  I10 401.9 CBC WITH AUTOMATED DIFF      LIPID PANEL      METABOLIC PANEL, COMPREHENSIVE      chlorthalidone (HYGROTON) 25 mg tablet   10. Pharyngoesophageal dysphagia  R13.14 787.24 REFERRAL TO GASTROENTEROLOGY     Diagnoses and all orders for this visit:    1. Wellness examination  We are getting patient up-to-date on preventative measures as listed. 2. Screening for hyperlipidemia  -     CBC WITH AUTOMATED DIFF  -     LIPID PANEL  -     METABOLIC PANEL, COMPREHENSIVE    3. Encounter for hepatitis C screening test for low risk patient  -     HCV AB W/RFLX TO CHRISTELLE    4. Morbid obesity due to excess calories ([facility] [address]  Follows with weight loss specialist.  Also advised to keep dietary and exercise log for the next month to review at follow-up visit. Discussed active lifestyle approaches including good nutrition, exercise goals, sleep hygiene, and proper weight management.    -     TSH RFX ON ABNORMAL TO FREE T4    5. Vaccine refused by patient  Declines flu and tetanus vaccines. 6. Positive depression screening  Attributed to recent anniversaries of family members passing and holidays. Declines medication today. Will reassess at f/u in one month. -     TSH RFX ON ABNORMAL TO FREE T4    7. Seasonal depression ([facility] [address]  As above. -     TSH RFX ON ABNORMAL TO FREE T4    8. Gastroesophageal reflux disease, unspecified whether esophagitis present  Reflux has improved with pantoprazole daily and positive dietary changes. Referring to GI for further evaluation of dysphagia. -     pantoprazole (PROTONIX) 20 mg tablet; Take 1 Tablet by mouth daily.  -     REFERRAL TO GASTROENTEROLOGY    9. Primary hypertension  BP is not at goal today in the office or on home readings. She is agreeable to beginning low-dose BP meds today in combination with lifestyle modifications as addressed below.  She will continue to check home BP readings after taking medications, and after sitting quietly for 5 minutes with both feet flat on the floor and arm at heart level. She will bring readings to f/u appointment or contact provider sooner if consistently greater than 140/90 on either number. Increase regular exercise to 150 minutes per week of mild breathlessness, stay hydrated, and limit salt in diet. -     CBC WITH AUTOMATED DIFF  -     LIPID PANEL  -     METABOLIC PANEL, COMPREHENSIVE  -     chlorthalidone (HYGROTON) 25 mg tablet; Take 1/2 tablet daily. 10. Pharyngoesophageal dysphagia  As above. -     REFERRAL TO GASTROENTEROLOGY      Follow-up and Dispositions    · Return in about 1 month (around 2/5/2022) for follow up, hypertension, weight, depression, insomnia, nonfasting.            Calvin Severe, NP

## 2022-01-06 LAB
ALBUMIN SERPL-MCNC: 4.3 G/DL (ref 3.8–4.8)
ALBUMIN/GLOB SERPL: 1.4 {RATIO} (ref 1.2–2.2)
ALP SERPL-CCNC: 93 IU/L (ref 44–121)
ALT SERPL-CCNC: 15 IU/L (ref 0–32)
AST SERPL-CCNC: 12 IU/L (ref 0–40)
BASOPHILS # BLD AUTO: 0 X10E3/UL (ref 0–0.2)
BASOPHILS NFR BLD AUTO: 0 %
BILIRUB SERPL-MCNC: 0.2 MG/DL (ref 0–1.2)
BUN SERPL-MCNC: 14 MG/DL (ref 6–24)
BUN/CREAT SERPL: 21 (ref 9–23)
CALCIUM SERPL-MCNC: 8.8 MG/DL (ref 8.7–10.2)
CHLORIDE SERPL-SCNC: 106 MMOL/L (ref 96–106)
CHOLEST SERPL-MCNC: 197 MG/DL (ref 100–199)
CO2 SERPL-SCNC: 20 MMOL/L (ref 20–29)
CREAT SERPL-MCNC: 0.67 MG/DL (ref 0.57–1)
EOSINOPHIL # BLD AUTO: 0.2 X10E3/UL (ref 0–0.4)
EOSINOPHIL NFR BLD AUTO: 2 %
ERYTHROCYTE [DISTWIDTH] IN BLOOD BY AUTOMATED COUNT: 12.6 % (ref 11.7–15.4)
GLOBULIN SER CALC-MCNC: 3.1 G/DL (ref 1.5–4.5)
GLUCOSE SERPL-MCNC: 108 MG/DL (ref 65–99)
HCT VFR BLD AUTO: 38.6 % (ref 34–46.6)
HCV AB S/CO SERPL IA: <0.1 S/CO RATIO (ref 0–0.9)
HCV AB SERPL QL IA: NORMAL
HDLC SERPL-MCNC: 53 MG/DL
HGB BLD-MCNC: 13 G/DL (ref 11.1–15.9)
IMM GRANULOCYTES # BLD AUTO: 0 X10E3/UL (ref 0–0.1)
IMM GRANULOCYTES NFR BLD AUTO: 0 %
LDLC SERPL CALC-MCNC: 124 MG/DL (ref 0–99)
LYMPHOCYTES # BLD AUTO: 2.7 X10E3/UL (ref 0.7–3.1)
LYMPHOCYTES NFR BLD AUTO: 27 %
MCH RBC QN AUTO: 29.5 PG (ref 26.6–33)
MCHC RBC AUTO-ENTMCNC: 33.7 G/DL (ref 31.5–35.7)
MCV RBC AUTO: 88 FL (ref 79–97)
MONOCYTES # BLD AUTO: 0.5 X10E3/UL (ref 0.1–0.9)
MONOCYTES NFR BLD AUTO: 5 %
NEUTROPHILS # BLD AUTO: 6.6 X10E3/UL (ref 1.4–7)
NEUTROPHILS NFR BLD AUTO: 66 %
PLATELET # BLD AUTO: 333 X10E3/UL (ref 150–450)
POTASSIUM SERPL-SCNC: 4 MMOL/L (ref 3.5–5.2)
PROT SERPL-MCNC: 7.4 G/DL (ref 6–8.5)
RBC # BLD AUTO: 4.41 X10E6/UL (ref 3.77–5.28)
SODIUM SERPL-SCNC: 141 MMOL/L (ref 134–144)
TRIGL SERPL-MCNC: 111 MG/DL (ref 0–149)
TSH SERPL DL<=0.005 MIU/L-ACNC: 1.18 UIU/ML (ref 0.45–4.5)
VLDLC SERPL CALC-MCNC: 20 MG/DL (ref 5–40)
WBC # BLD AUTO: 10.1 X10E3/UL (ref 3.4–10.8)

## 2022-02-08 ENCOUNTER — OFFICE VISIT (OUTPATIENT)
Dept: FAMILY MEDICINE CLINIC | Age: 42
End: 2022-02-08
Payer: COMMERCIAL

## 2022-02-08 VITALS
WEIGHT: 292.25 LBS | TEMPERATURE: 97.5 F | HEIGHT: 60 IN | DIASTOLIC BLOOD PRESSURE: 84 MMHG | HEART RATE: 100 BPM | SYSTOLIC BLOOD PRESSURE: 134 MMHG | OXYGEN SATURATION: 98 % | RESPIRATION RATE: 16 BRPM | BODY MASS INDEX: 57.38 KG/M2

## 2022-02-08 DIAGNOSIS — I10 PRIMARY HYPERTENSION: Primary | ICD-10-CM

## 2022-02-08 DIAGNOSIS — E66.01 MORBID OBESITY DUE TO EXCESS CALORIES (HCC): ICD-10-CM

## 2022-02-08 DIAGNOSIS — F33.1 MODERATE EPISODE OF RECURRENT MAJOR DEPRESSIVE DISORDER (HCC): ICD-10-CM

## 2022-02-08 DIAGNOSIS — Z13.31 POSITIVE DEPRESSION SCREENING: ICD-10-CM

## 2022-02-08 DIAGNOSIS — F51.01 PRIMARY INSOMNIA: ICD-10-CM

## 2022-02-08 PROCEDURE — 99214 OFFICE O/P EST MOD 30 MIN: CPT | Performed by: NURSE PRACTITIONER

## 2022-02-08 RX ORDER — CHLORTHALIDONE 25 MG/1
25 TABLET ORAL DAILY
Qty: 30 TABLET | Refills: 0 | Status: SHIPPED | OUTPATIENT
Start: 2022-02-08 | End: 2022-05-11

## 2022-02-08 RX ORDER — BUPROPION HYDROCHLORIDE 150 MG/1
150 TABLET ORAL DAILY
Qty: 90 TABLET | Refills: 0 | Status: SHIPPED | OUTPATIENT
Start: 2022-02-08 | End: 2022-05-11

## 2022-02-08 NOTE — PROGRESS NOTES
Subjective  Chief Complaint   Patient presents with    Follow-up     HTN, insomnia, depression, weight     HPI:  Rebecca Neumann is a 39 y.o. female. Patient presents for management of hypertension, depression, insomnia, and weight. Initially attributed depression to seasonal depression. Reports ongoing depression, falling asleep, and staying asleep. Interested in medication management, has not taken meds in the past.   Continues to exercise 3 days per week at gym and walk at least 20 minutes daily. Presents with dietary log for review. Management of HTN-  Current meds: Chlorthalidone 12.5 mg daily, started at last visit. Home BP readings: upper 130s/80-90  Denies lightheadedness, dizziness, headaches, chest pain, palpitations, and lower extremity edema.         Past Medical History:   Diagnosis Date    Elevated blood pressure reading     High cholesterol     Irregular periods/menstrual cycles     Kidney stone      Family History   Problem Relation Age of Onset    Cancer Mother         Leukemia    High Cholesterol Father     Cancer Maternal Grandmother 80        colon cancer    Cancer Maternal Uncle         Lung Cancer    High Cholesterol Brother     COPD Paternal Grandfather     Alzheimer's Disease Maternal Grandfather      Social History     Socioeconomic History    Marital status: SINGLE     Spouse name: Not on file    Number of children: 0    Years of education: Not on file    Highest education level: Not on file   Occupational History    Occupation: Security   Tobacco Use    Smoking status: Never Smoker    Smokeless tobacco: Never Used    Tobacco comment: boyfriend smokes in apartment   Vaping Use    Vaping Use: Never used   Substance and Sexual Activity    Alcohol use: Yes     Comment: rarely    Drug use: No    Sexual activity: Yes     Partners: Male     Birth control/protection: None   Other Topics Concern     Service Not Asked    Blood Transfusions Not Asked    Caffeine Concern Not Asked    Occupational Exposure Not Asked   [place] Hazards Not Asked    Sleep Concern Not Asked    Stress Concern Not Asked    Weight Concern Not Asked    Special Diet Not Asked    Back Care Not Asked    Exercise No    Bike Helmet Not Asked   [address] Not Asked    Self-Exams Not Asked   Social History Narrative    Lives with boyfriend (12 years in 2016). No pets. Social Determinants of Health     Financial Resource Strain:     Difficulty of Paying Living Expenses: Not on file   Food Insecurity:     Worried About Running Out of Food in the Last Year: Not on file    Brielle of Food in the Last Year: Not on file   Transportation Needs:     Lack of Transportation (Medical): Not on file    Lack of Transportation (Non-Medical): Not on file   Physical Activity:     Days of Exercise per Week: Not on file    Minutes of Exercise per Session: Not on file   Stress:     Feeling of Stress : Not on file   Social Connections:     Frequency of Communication with Friends and Family: Not on file    Frequency of Social Gatherings with Friends and Family: Not on file    Attends Jainism Services: Not on file    Active Member of [address] [place] [address] [employer] or Organizations: Not on file    Attends Club or Organization Meetings: Not on file    Marital Status: Not on file   Intimate Partner Violence:     Fear of Current or Ex-Partner: Not on file    Emotionally Abused: Not on file    Physically Abused: Not on file    Sexually Abused: Not on file   Housing Stability:     Unable to Pay for Housing in the Last Year: Not on file    Number of [place] in the Last Year: Not on file    Unstable Housing in the Last Year: Not on file     Current Outpatient Medications on File Prior to Visit   Medication Sig Dispense Refill    pantoprazole (PROTONIX) 20 mg tablet Take 1 Tablet by mouth daily. 90 Tablet 0    PROGESTERONE 250 Capsules by Does Not Apply route.       [DISCONTINUED] chlorthalidone (HYGROTON) 25 mg tablet Take 1/2 tablet daily. 15 Tablet 0     No current facility-administered medications on file prior to visit. Allergies   Allergen Reactions    Pcn [Penicillins] Hives     ROS  See HPI for pertinent ROS. Objective  Visit Vitals  /84   Pulse 100   Temp 97.5 °F (36.4 °C) (Temporal)   Resp 16   Ht 5' (1.524 m)   Wt 292 lb 4 oz (132.6 kg)   SpO2 98%   BMI 57.08 kg/m²       Physical Exam  Vitals and nursing note reviewed. Constitutional:       General: She is not in acute distress. Appearance: Normal appearance. She is morbidly obese. HENT:      Head: Normocephalic. Eyes:      Extraocular Movements: Extraocular movements intact. Cardiovascular:      Rate and Rhythm: Normal rate and regular rhythm. Heart sounds: Normal heart sounds. Pulmonary:      Effort: Pulmonary effort is normal.      Breath sounds: Normal breath sounds. Musculoskeletal:         General: Normal range of motion. Right lower leg: No edema. Left lower leg: No edema. Skin:     General: Skin is warm and dry. Neurological:      Mental Status: She is alert and oriented to person, place, and time. Psychiatric:         Mood and Affect: Mood normal.         Behavior: Behavior normal.          Assessment & Plan      ICD-10-CM ICD-9-CM    1. Primary hypertension  I10 401.9 chlorthalidone (HYGROTON) 25 mg tablet   2. Moderate episode of recurrent major depressive disorder (HCC)  F33.1 296.32 buPROPion XL (WELLBUTRIN XL) 150 mg tablet   3. Positive depression screening  Z13.31 796.4    4. Primary insomnia  F51.01 307.42    5. Morbid obesity due to excess calories (HCC)  E66.01 278.01 REFERRAL TO WEIGHT LOSS     Diagnoses and all orders for this visit:    1. Primary hypertension  BP above goal on intake but below goal on recheck. Reported home BP readings remain borderline. Increase Chlorthalidone as ordered. Continue to check BP readings daily with goal less than 140/90 on both numbers.  Bbring log to f/u in one months. -     chlorthalidone (HYGROTON) 25 mg tablet; Take 1 Tablet by mouth daily. Take 1/2 tablet daily. 2. Moderate episode of recurrent major depressive disorder (HCC)  Depression screening remains positive today. Start Wellbutrin as ordered. Advised side effects are worse during the first week and then improve (feeling stimulated or sedated, upset stomach, dry mouth, headache, or sexual difficulties). Instructed to take medication daily and not stop abruptly. Discussed it can take 6-8 weeks to reach therapeutic dosing. Also warned of possible SI in some people on Wellbutrin. Understands to seek immediate medical attention if that should occur.         -     buPROPion XL (WELLBUTRIN XL) 150 mg tablet; Take 1 Tablet by mouth daily. 3. Positive depression screening  As above. 4. Primary insomnia  Discussed the relationship between depression and insomnia. Insomnia should improve once depression is under better control. Will continue to monitor. 5. Morbid obesity due to excess calories ([address]  Home dietary log reviewed confirming healthy dietary habits. Exercise is at goal, reminded to exercise at a moderate intensity. Limit portions. Referral for further evaluation.  -     REFERRAL TO WEIGHT LOSS      Follow-up and Dispositions    · Return in about 1 month (around 3/8/2022) for follow up, hypertension, depression, insomnia, VV ok.            Elvira Memory, NP

## 2022-02-08 NOTE — PROGRESS NOTES
Chief Complaint   Patient presents with    Follow-up     HTN, insomnia, depression, weight   1. Have you been to the ER, urgent care clinic since your last visit? Hospitalized since your last visit? No    2. Have you seen or consulted any other health care providers outside of the [address] [place] [address] since your last visit? Include any pap smears or colon screening.  No   3 most recent PHQ Screens 1/5/2022   Little interest or pleasure in doing things Several days   Feeling down, depressed, irritable, or hopeless Nearly every day   Total Score PHQ 2 4   Trouble falling or staying asleep, or sleeping too much Nearly every day   Feeling tired or having little energy Nearly every day   Poor appetite, weight loss, or overeating More than half the days   Feeling bad about yourself - or that you are a failure or have let yourself or your family down Several days   Trouble concentrating on things such as school, work, reading, or watching TV More than half the days   Moving or speaking so slowly that other people could have noticed; or the opposite being so fidgety that others notice Several days   Thoughts of being better off dead, or hurting yourself in some way Not at all   PHQ 9 Score 16   How difficult have these problems made it for you to do your work, take care of your home and get along with others Somewhat difficult     Visit Vitals  BP (!) 152/90 (BP 1 Location: Right upper arm, BP Patient Position: Sitting)   Pulse 100   Temp 97.5 °F (36.4 °C) (Temporal)   Resp 16   Ht 5' (1.524 m)   Wt 292 lb 4 oz (132.6 kg)   SpO2 98%   BMI 57.08 kg/m²

## 2022-04-11 ENCOUNTER — TELEPHONE (OUTPATIENT)
Dept: SURGERY | Age: 42
End: 2022-04-11

## 2022-04-20 DIAGNOSIS — Z76.89 ENCOUNTER FOR WEIGHT MANAGEMENT: Primary | ICD-10-CM

## 2022-04-20 DIAGNOSIS — E66.01 MORBID OBESITY (HCC): ICD-10-CM

## 2022-05-11 ENCOUNTER — HOSPITAL ENCOUNTER (OUTPATIENT)
Dept: NON INVASIVE DIAGNOSTICS | Age: 42
Discharge: HOME | End: 2022-05-11
Payer: COMMERCIAL

## 2022-05-11 ENCOUNTER — OFFICE VISIT (OUTPATIENT)
Dept: SURGERY | Age: 42
End: 2022-05-11
Payer: COMMERCIAL

## 2022-05-11 VITALS
TEMPERATURE: 97.9 F | HEIGHT: 60 IN | RESPIRATION RATE: 20 BRPM | OXYGEN SATURATION: 97 % | BODY MASS INDEX: 57.52 KG/M2 | SYSTOLIC BLOOD PRESSURE: 138 MMHG | WEIGHT: 293 LBS | DIASTOLIC BLOOD PRESSURE: 82 MMHG | HEART RATE: 96 BPM

## 2022-05-11 DIAGNOSIS — E78.00 PURE HYPERCHOLESTEROLEMIA: ICD-10-CM

## 2022-05-11 DIAGNOSIS — E66.01 MORBID OBESITY DUE TO EXCESS CALORIES (HCC): Primary | ICD-10-CM

## 2022-05-11 DIAGNOSIS — E66.01 MORBID OBESITY DUE TO EXCESS CALORIES (HCC): ICD-10-CM

## 2022-05-11 LAB
ATRIAL RATE: 97 BPM
CALCULATED P AXIS, ECG09: 47 DEGREES
CALCULATED R AXIS, ECG10: 42 DEGREES
CALCULATED T AXIS, ECG11: 40 DEGREES
DIAGNOSIS, 93000: NORMAL
P-R INTERVAL, ECG05: 158 MS
Q-T INTERVAL, ECG07: 372 MS
QRS DURATION, ECG06: 82 MS
QTC CALCULATION (BEZET), ECG08: 472 MS
VENTRICULAR RATE, ECG03: 97 BPM

## 2022-05-11 PROCEDURE — 93005 ELECTROCARDIOGRAM TRACING: CPT

## 2022-05-11 PROCEDURE — 99203 OFFICE O/P NEW LOW 30 MIN: CPT | Performed by: INTERNAL MEDICINE

## 2022-05-11 RX ORDER — PHENTERMINE HYDROCHLORIDE 15 MG/1
15 CAPSULE ORAL
Qty: 30 CAPSULE | Refills: 0 | Status: SHIPPED | OUTPATIENT
Start: 2022-05-11 | End: 2022-06-15 | Stop reason: DRUGHIGH

## 2022-05-11 NOTE — PROGRESS NOTES
1. Have you been to the ER, urgent care clinic since your last visit? Hospitalized since your last visit? No    2. Have you seen or consulted any other health care providers outside of the [address] [place] [address] since your last visit? Include any pap smears or colon screening.  No

## 2022-05-11 NOTE — PROGRESS NOTES
[address] [facility] [address]  HISTORY OF PRESENT ILLNESS  Daphney Gerard is a 39 y.o. female. Patient is a  female here at the weight management center. PMH of elevated BP, HDL and elevated glucose. Patients goal is to lose around 100 lbs. today's weight is 300 lbs. This is her heaviest. States that she eats lean and a lot of vegetables, but eats a lot a sweets. No sodas and could improve water. Reports that she goes to the gym at least 3 times a week. Is not currently on any medications. Is seeing a new PCP in the next few months. describes that her sleep is around 4-6 hours a night.   % body fat is 51. BMI today is 58.67kg. Labs were done a few months ago. Lipids abnormal and glucose elevated.    Visit Vitals  /82 (BP 1 Location: Left arm, BP Patient Position: Sitting, BP Cuff Size: Thigh)   Pulse 96   Temp 97.9 °F (36.6 °C)   Resp 20   Ht 5' (1.524 m)   Wt 300 lb 6.4 oz (136.3 kg)   LMP 04/09/2022 (Approximate)   SpO2 97%   BMI 58.67 kg/m²     Past Medical History:   Diagnosis Date    Depression 05/11/2022    Difficulty swallowing 05/11/2022    Elevated blood pressure reading     GERD (gastroesophageal reflux disease) 05/11/2022    High cholesterol     Irregular periods/menstrual cycles     Kidney stone     Obesity 05/11/2022     Past Surgical History:   Procedure Laterality Date    HX UROLOGICAL  2016    kidney stone removed    MA CYSTOSCOPY,INSERT URETERAL STENT  6.23.16    Dr. Kierra Coyle ([facility])     Family History   Problem Relation Age of Onset    Leukemia Mother     High Cholesterol Father     Hypertension Father     Colon Cancer Maternal Grandmother 80    Lung Cancer Maternal Uncle     High Cholesterol Brother     COPD Paternal Grandfather     Alzheimer's Disease Maternal Grandfather      Outpatient Encounter Medications as of 5/11/2022   Medication Sig Dispense Refill    phentermine (ADIPEX_P) 15 mg capsule Take 1 Capsule by mouth every morning. Max Daily Amount: 15 mg. 30 Capsule 0    [DISCONTINUED] chlorthalidone (HYGROTON) 25 mg tablet Take 1 Tablet by mouth daily. Take 1/2 tablet daily. (Patient not taking: Reported on 5/11/2022) 30 Tablet 0    [DISCONTINUED] buPROPion XL (WELLBUTRIN XL) 150 mg tablet Take 1 Tablet by mouth daily. (Patient not taking: Reported on 5/11/2022) 90 Tablet 0    [DISCONTINUED] pantoprazole (PROTONIX) 20 mg tablet Take 1 Tablet by mouth daily. (Patient not taking: Reported on 5/11/2022) 90 Tablet 0    [DISCONTINUED] PROGESTERONE 250 Capsules by Does Not Apply route. (Patient not taking: Reported on 5/11/2022)       No facility-administered encounter medications on file as of 5/11/2022. HPI    Review of Systems   All other systems reviewed and are negative. Physical Exam  Vitals and nursing note reviewed. Constitutional:       Appearance: Normal appearance. Cardiovascular:      Rate and Rhythm: Normal rate and regular rhythm. Pulmonary:      Effort: Pulmonary effort is normal.      Breath sounds: Normal breath sounds. Abdominal:      Palpations: Abdomen is soft. Skin:     General: Skin is warm. Neurological:      Mental Status: She is alert and oriented to person, place, and time. Psychiatric:         Behavior: Behavior normal.         ASSESSMENT and PLAN  Diagnoses and all orders for this visit:    1. Morbid obesity due to excess calories (HCC)  -     phentermine (ADIPEX_P) 15 mg capsule; Take 1 Capsule by mouth every morning. Max Daily Amount: 15 mg.  -     REFERRAL TO DIETITIAN  -     EKG, 12 LEAD, INITIAL; Future        -     Encouraged water at at least 72 oz daily         -     Activity to continue to at least 3-4 times a week  2.  Pure hypercholesterolemia      Follow-up and Dispositions    · Return in 1 month (on 6/11/2022), or if symptoms worsen or fail to improve.       lab results and schedule of future lab studies reviewed with patient  reviewed diet, exercise and weight control  reviewed medications and side effects in detail

## 2022-06-07 ENCOUNTER — OFFICE VISIT (OUTPATIENT)
Dept: FAMILY MEDICINE CLINIC | Age: 42
End: 2022-06-07
Payer: COMMERCIAL

## 2022-06-07 VITALS
HEART RATE: 103 BPM | TEMPERATURE: 97.9 F | SYSTOLIC BLOOD PRESSURE: 126 MMHG | BODY MASS INDEX: 57.52 KG/M2 | HEIGHT: 60 IN | WEIGHT: 293 LBS | OXYGEN SATURATION: 97 % | DIASTOLIC BLOOD PRESSURE: 80 MMHG

## 2022-06-07 DIAGNOSIS — F33.1 MODERATE EPISODE OF RECURRENT MAJOR DEPRESSIVE DISORDER (HCC): ICD-10-CM

## 2022-06-07 DIAGNOSIS — E66.01 MORBID OBESITY DUE TO EXCESS CALORIES (HCC): ICD-10-CM

## 2022-06-07 DIAGNOSIS — E78.00 PURE HYPERCHOLESTEROLEMIA: ICD-10-CM

## 2022-06-07 DIAGNOSIS — R73.9 HYPERGLYCEMIA: ICD-10-CM

## 2022-06-07 DIAGNOSIS — R13.10 DYSPHAGIA, UNSPECIFIED TYPE: Primary | ICD-10-CM

## 2022-06-07 DIAGNOSIS — R03.0 ELEVATED BP WITHOUT DIAGNOSIS OF HYPERTENSION: ICD-10-CM

## 2022-06-07 DIAGNOSIS — K21.9 GASTROESOPHAGEAL REFLUX DISEASE, UNSPECIFIED WHETHER ESOPHAGITIS PRESENT: ICD-10-CM

## 2022-06-07 PROCEDURE — 99204 OFFICE O/P NEW MOD 45 MIN: CPT | Performed by: STUDENT IN AN ORGANIZED HEALTH CARE EDUCATION/TRAINING PROGRAM

## 2022-06-07 RX ORDER — PANTOPRAZOLE SODIUM 20 MG/1
20 TABLET, DELAYED RELEASE ORAL DAILY
Qty: 90 TABLET | Refills: 1 | Status: SHIPPED | OUTPATIENT
Start: 2022-06-07

## 2022-06-07 RX ORDER — BUPROPION HYDROCHLORIDE 150 MG/1
150 TABLET ORAL DAILY
Qty: 90 TABLET | Refills: 1 | Status: SHIPPED | OUTPATIENT
Start: 2022-06-07

## 2022-06-07 NOTE — PROGRESS NOTES
Shant Lee is a 39 y.o. female , id x 2(name and ). Reviewed record, history, and  medications. Chief Complaint   Patient presents with    New Patient    Rib Pain     (R) since Saturday. states that it happened after she turned too fast. has taken advil for the pain.  Dysphagia     states that thicker food feel like they get trapped in her throat, would like a GI referral     Medication Question     would like to know if phentermine 15mg is safe to take with bupropion 150mg     * has been on BP meds previously, currently off  *previously on Bupropion   * currently on weight management program currently on phentermine 15mg  *previously on pantoprazole      Vitals:    22 1410   BP: 126/80   Pulse: (!) 103   Temp: 97.9 °F (36.6 °C)   SpO2: 97%   Weight: 297 lb 3.2 oz (134.8 kg)   Height: 5' (1.524 m)       Coordination of Care Questionnaire:   1) Have you been to an emergency room, urgent care, or hospitalized since your last visit?   no       2. Have seen or consulted any other health care provider since your last visit?  NO      3 most recent PHQ Screens 2022   Little interest or pleasure in doing things Not at all   Feeling down, depressed, irritable, or hopeless Not at all   Total Score PHQ 2 0   Trouble falling or staying asleep, or sleeping too much -   Feeling tired or having little energy -   Poor appetite, weight loss, or overeating -   Feeling bad about yourself - or that you are a failure or have let yourself or your family down -   Trouble concentrating on things such as school, work, reading, or watching TV -   Moving or speaking so slowly that other people could have noticed; or the opposite being so fidgety that others notice -   Thoughts of being better off dead, or hurting yourself in some way -   PHQ 9 Score -   How difficult have these problems made it for you to do your work, take care of your home and get along with others -       Patient is accompanied by self I have received verbal consent from [facility] to discuss any/all medical information while they are present in the room.

## 2022-06-07 NOTE — PROGRESS NOTES
Progress Note    she is a 39y.o. year old female who presents for evalution. Assessment/ Plan:   Diagnoses and all orders for this visit:    1. Dysphagia, unspecified type  Assessment & Plan:  Reviewed precautions with food and drink intake  Referred to GI for endoscopy    Orders:  -     REFERRAL TO GASTROENTEROLOGY    2. Gastroesophageal reflux disease, unspecified whether esophagitis present  Assessment & Plan:   uncontrolled, changes made today: Restart Protonix    Orders:  -     pantoprazole (PROTONIX) 20 mg tablet; Take 1 Tablet by mouth daily. 3. Moderate episode of recurrent major depressive disorder ([facility] [address]  Assessment & Plan:   uncontrolled, changes made today: Restart Wellbutrin    Orders:  -     buPROPion XL (WELLBUTRIN XL) 150 mg tablet; Take 1 Tablet by mouth daily. 4. Pure hypercholesterolemia  Assessment & Plan:  No current medication, has made lifestyle changes. Reassess with labs today    Orders:  -     METABOLIC PANEL, COMPREHENSIVE; Future  -     LIPID PANEL; Future    5. Hyperglycemia  Assessment & Plan:  Labs from 1/2022 reviewed, assess A1c today    Orders:  -     HEMOGLOBIN A1C WITH EAG; Future    6. Elevated BP without diagnosis of hypertension  Assessment & Plan:  Previously on chlorthalidone, no current medication  Controlling with low-salt diet. Monitoring blood pressure at home, borderline controlled  Encouraged to continue home blood pressure monitoring, particularly with phentermine on board      7. Morbid obesity due to excess calories [facility])  Assessment & Plan:  Working with bariatric specialist, on phentermine         Patient is fasting for labs today. Follow-up and Dispositions    · Return in about 6 months (around 12/7/2022) for follow-up mood. I have discussed the diagnosis with the patient and the intended plan as seen in the above orders. The patient has received an after-visit summary and questions were answered concerning future plans.    Pt conveyed understanding of plan. Medication Side Effects and Warnings were discussed with patient        Subjective:     Chief Complaint   Patient presents with    New Patient    Rib Pain     (R) since Saturday. states that it happened after she turned too fast. has taken advil for the pain.  Dysphagia     states that thicker food feel like they get trapped in her throat, would like a GI referral     Medication Question     would like to know if phentermine 15mg is safe to take with bupropion 150mg     Previously followed with [address] [place] [address] with bariatric specialist  No other specialists. Dysphagia going on for \"awhile\"  Initially not as much of a problem, but had spaghetti earlier this year that \"took forever to go down\". Some issues with liquids if she drinks too much at a time. She will have issues coughing. Saturday wasn't doing anything in particular, sudden turn to get something behind her  Experienced a sharp pain in her R lateral lower ribcage  Pain has improved. Has taken ibuprofen. On Wellbutrin for mood  Mood has been stable \"for the most part\". Stopped it a month ago when she started the phentermine. Has noticed decreased appetite   Has noticed some weight loss  Has a bp cuff at home, been staying 120s-130s. Has cut out a lot of salt and trejo      Reviewed PmHx, RxHx, FmHx, SocHx, AllgHx and updated and dated in the chart. Review of Systems - negative except as listed above in the HPI    Objective:     Vitals:    06/07/22 1410   BP: 126/80   Pulse: (!) 103   Temp: 97.9 °F (36.6 °C)   SpO2: 97%   Weight: 297 lb 3.2 oz (134.8 kg)   Height: 5' (1.524 m)       Current Outpatient Medications   Medication Sig    buPROPion XL (WELLBUTRIN XL) 150 mg tablet Take 1 Tablet by mouth daily.  pantoprazole (PROTONIX) 20 mg tablet Take 1 Tablet by mouth daily.  phentermine (ADIPEX_P) 15 mg capsule Take 1 Capsule by mouth every morning. Max Daily Amount: 15 mg. No current facility-administered medications for this visit. Physical Exam  Vitals and nursing note reviewed. Constitutional:       General: She is not in acute distress. Appearance: Normal appearance. She is obese. She is not ill-appearing, toxic-appearing or diaphoretic. HENT:      Head: Normocephalic and atraumatic. Eyes:      General: No scleral icterus. Right eye: No discharge. Left eye: No discharge. Conjunctiva/sclera: Conjunctivae normal.   Cardiovascular:      Rate and Rhythm: Normal rate and regular rhythm. Pulses: Normal pulses. Heart sounds: Normal heart sounds. Pulmonary:      Effort: Pulmonary effort is normal. No respiratory distress. Breath sounds: Normal breath sounds. Abdominal:      Palpations: Abdomen is soft. Tenderness: There is no abdominal tenderness. Musculoskeletal:         General: No tenderness. Cervical back: No rigidity. Right lower leg: No edema. Left lower leg: No edema. Skin:     General: Skin is warm and dry. Neurological:      General: No focal deficit present. Mental Status: She is alert. Psychiatric:         Mood and Affect: Mood normal.         Behavior: Behavior normal.         Thought Content:  Thought content normal.         Judgment: Judgment normal.              Aldo Umaña MD

## 2022-06-07 NOTE — PATIENT INSTRUCTIONS
You should purchase a blood pressure cuff to check your blood pressure at home. Check first thing in the morning. You should be relaxed, seated with back supported, feet flat on the floor, arm with cuff resting at heart height. You should check your blood pressure 1-3 times. Please write down your blood pressures and bring this record and your blood pressure cuff/machine to your follow-up visit. I would like for your blood pressure to be less than 130 for the top number and less than 90 for the bottom number. Please follow-up sooner for consistently high blood pressure readings at home. 7 Tips for How to [place] When Things Feel Out of Control  When life feels chaotic or overwhelming, it can be easy to get stuck in a cycle of stress and worry. But there are things you can do to cope with the chaos and find some calm. Here are some tips. Be aware of your feelings. Try to note what you're feeling when you're feeling it, without judging it as \"good\" or \"bad. \" It might help to write down how you're feeling and why. Notice your mindset. The way you think about things really does affect the way you feel. If you tell yourself something is too hard or too stressful, it's going to feel that way. But if you tell yourself you can handle something hard, you're more likely to be able to. Focus on what you can control. Try not to focus on what you can't. Make a list of the things that cause you stress. Then decide which things on the list you can take action on and which you can't. This can remind you what's in your control and what isn't. Spend time doing things that are meaningful to you. For example, you could do projects with your kids, foster an animal, write postcards to friends, or do random acts of kindness for your neighbors. Do things that make you feel good or bring you bassam. Look for sources of stress you can limit. Then take steps to limit them.  That might mean turning off the news, staying away from social media, or even having less contact with certain people. Distract yourself. Spend time on a hobby or project. Call a friend. Start watching a new TV series. Whatever you decide, make sure it makes you happy and feels worth your time. Be sure your coping strategies are helpful. Find things to do that help calm and relax you. Reading for fun, taking a bath, or spending some time being mindful are the types of things that are more likely to reduce stress than add to it. But things like staying too busy might exhaust you or add stress. A glass of wine or a beer in the evening may not be a bad thing for some people, but alcohol can make stress and anxiety worse. Just make sure that the things you're doing to cope are helping rather than hurting. Current as of: June 16, 2021               Content Version: 13.2  © 9331-2095 [employer]. Care instructions adapted under license by [employer] (which disclaims liability or warranty for this information). If you have questions about a medical condition or this instruction, always ask your healthcare professional. [place] [address] any warranty or liability for your use of this information. Learning About Sleeping Well  What does sleeping well mean? Sleeping well means getting enough sleep to feel good and stay healthy. How much sleep is enough varies among people. The number of hours you sleep and how you feel when you wake up are both important. If you do not feel refreshed, you probably need more sleep. Another sign of not getting enough sleep is feeling tired during the day. Experts recommend that adults get at least 7 or more hours of sleep per day. Children and older adults need more sleep. Why is getting enough sleep important? Getting enough quality sleep is a basic part of good health.  When your sleep suffers, your physical health, mood, and your thoughts can suffer too. You may find yourself feeling more grumpy or stressed. Not getting enough sleep also can lead to serious problems, including injury, accidents, anxiety, and depression. What might cause poor sleeping? Many things can cause sleep problems, including:  · Changes to your sleep schedule. · Stress. Stress can be caused by fear about a single event, such as giving a speech. Or you may have ongoing stress, such as worry about work or school. · Depression, anxiety, and other mental or emotional conditions. · Changes in your sleep habits or surroundings. This includes changes that happen where you sleep, such as noise, light, or sleeping in a different bed. It also includes changes in your sleep pattern, such as having jet lag or working a late shift. · Health problems, such as pain, breathing problems, and restless legs syndrome. · Lack of regular exercise. · Using alcohol, nicotine, or caffeine before bed. How can you help yourself? Here are some tips that may help you sleep more soundly and wake up feeling more refreshed. Your sleeping area   · Use your bedroom only for sleeping and sex. A bit of light reading may help you fall asleep. But if it doesn't, do your reading elsewhere in the house. Try not to use your TV, computer, smartphone, or tablet while you are in bed. · Be sure your bed is big enough to stretch out comfortably, especially if you have a sleep partner. · Keep your bedroom quiet, dark, and cool. Use curtains, blinds, or a sleep mask to block out light. To block out noise, use earplugs, soothing music, or a \"white noise\" machine. Your evening and bedtime routine   · Create a relaxing bedtime routine. You might want to take a warm shower or bath, or listen to soothing music. · Go to bed at the same time every night. And get up at the same time every morning, even if you feel tired.   What to avoid   · Limit caffeine (coffee, tea, caffeinated sodas) during the day, and don't have any for at least 6 hours before bedtime. · Avoid drinking alcohol before bedtime. Alcohol can cause you to wake up more often during the night. · Try not to smoke or use tobacco, especially in the evening. Nicotine can keep you awake. · Limit naps during the day, especially close to bedtime. · Avoid lying in bed awake for too long. If you can't fall asleep or if you wake up in the middle of the night and can't get back to sleep within about 20 minutes, get out of bed and go to another room until you feel sleepy. · Avoid taking medicine right before bed that may keep you awake or make you feel hyper or energized. Your doctor can tell you if your medicine may do this and if you can take it earlier in the day. If you can't sleep   · Imagine yourself in a peaceful, pleasant scene. Focus on the details and feelings of being in a place that is relaxing. · Get up and do a quiet or boring activity until you feel sleepy. · Avoid drinking any liquids before going to bed to help prevent waking up often to use the bathroom. Where can you learn more? Go to http://www.gray.com/  Enter Q553 in the search box to learn more about \"Learning About Sleeping Well. \"  Current as of: June 16, 2021               Content Version: 13.2  © 2006-2022 [employer]. Care instructions adapted under license by [employer] (which disclaims liability or warranty for this information). If you have questions about a medical condition or this instruction, always ask your healthcare professional. [place] [address] any warranty or liability for your use of this information.

## 2022-06-07 NOTE — ASSESSMENT & PLAN NOTE
Previously on chlorthalidone, no current medication  Controlling with low-salt diet.   Monitoring blood pressure at home, borderline controlled  Encouraged to continue home blood pressure monitoring, particularly with phentermine on board

## 2022-06-08 LAB
ALBUMIN SERPL-MCNC: 4.2 G/DL (ref 3.8–4.8)
ALBUMIN/GLOB SERPL: 1.3 {RATIO} (ref 1.2–2.2)
ALP SERPL-CCNC: 83 IU/L (ref 44–121)
ALT SERPL-CCNC: 12 IU/L (ref 0–32)
AST SERPL-CCNC: 11 IU/L (ref 0–40)
BILIRUB SERPL-MCNC: 0.2 MG/DL (ref 0–1.2)
BUN SERPL-MCNC: 14 MG/DL (ref 6–24)
BUN/CREAT SERPL: 22 (ref 9–23)
CALCIUM SERPL-MCNC: 9.2 MG/DL (ref 8.7–10.2)
CHLORIDE SERPL-SCNC: 102 MMOL/L (ref 96–106)
CHOLEST SERPL-MCNC: 204 MG/DL (ref 100–199)
CO2 SERPL-SCNC: 23 MMOL/L (ref 20–29)
CREAT SERPL-MCNC: 0.65 MG/DL (ref 0.57–1)
EGFR: 113 ML/MIN/1.73
EST. AVERAGE GLUCOSE BLD GHB EST-MCNC: 120 MG/DL
GLOBULIN SER CALC-MCNC: 3.2 G/DL (ref 1.5–4.5)
GLUCOSE SERPL-MCNC: 89 MG/DL (ref 65–99)
HBA1C MFR BLD: 5.8 % (ref 4.8–5.6)
HDLC SERPL-MCNC: 54 MG/DL
IMP & REVIEW OF LAB RESULTS: NORMAL
LDLC SERPL CALC-MCNC: 132 MG/DL (ref 0–99)
POTASSIUM SERPL-SCNC: 4.5 MMOL/L (ref 3.5–5.2)
PROT SERPL-MCNC: 7.4 G/DL (ref 6–8.5)
SODIUM SERPL-SCNC: 140 MMOL/L (ref 134–144)
TRIGL SERPL-MCNC: 103 MG/DL (ref 0–149)
VLDLC SERPL CALC-MCNC: 18 MG/DL (ref 5–40)

## 2022-06-10 NOTE — PROGRESS NOTES
Normal CMP  Elevated A1c, prediabetic range. Focus on healthy lifestyle. Elevated LDL. 10 year risk 0.6%. Focus on healthy lifestyle.   Recheck labs in 6 months

## 2022-06-15 ENCOUNTER — OFFICE VISIT (OUTPATIENT)
Dept: SURGERY | Age: 42
End: 2022-06-15
Payer: COMMERCIAL

## 2022-06-15 VITALS
HEART RATE: 99 BPM | HEIGHT: 60 IN | BODY MASS INDEX: 57.51 KG/M2 | OXYGEN SATURATION: 98 % | DIASTOLIC BLOOD PRESSURE: 78 MMHG | TEMPERATURE: 98.4 F | SYSTOLIC BLOOD PRESSURE: 120 MMHG | WEIGHT: 292.9 LBS | RESPIRATION RATE: 18 BRPM

## 2022-06-15 DIAGNOSIS — R73.03 PREDIABETES: ICD-10-CM

## 2022-06-15 DIAGNOSIS — E78.00 PURE HYPERCHOLESTEROLEMIA: ICD-10-CM

## 2022-06-15 DIAGNOSIS — E66.01 MORBID OBESITY DUE TO EXCESS CALORIES (HCC): Primary | ICD-10-CM

## 2022-06-15 PROCEDURE — 99214 OFFICE O/P EST MOD 30 MIN: CPT | Performed by: INTERNAL MEDICINE

## 2022-06-15 RX ORDER — PHENTERMINE HYDROCHLORIDE 30 MG/1
30 CAPSULE ORAL
Qty: 30 CAPSULE | Refills: 0 | Status: SHIPPED | OUTPATIENT
Start: 2022-06-15 | End: 2022-07-13 | Stop reason: SDUPTHER

## 2022-06-15 NOTE — PROGRESS NOTES
Weight Management. 1 month follow up. 1. Have you been to the ER, urgent care clinic since your last visit? Hospitalized since your last visit? No    2. Have you seen or consulted any other health care providers outside of the [address] [place] [address] since your last visit? Include any pap smears or colon screening.  No     BMI - 57.1

## 2022-06-15 NOTE — PROGRESS NOTES
[address] [facility] [address]  HISTORY OF PRESENT ILLNESS  Trixie Almazan is a 39 y.o. female. Patient is a   female seen for a follow up at the weight management center. PMH of obesity, HLD, and depression. Patient reports that she is feeling well today. Is on phentermine 15 mg daily. States that this has helped her appetite. Does feel over the weekend she seems to have less control. Wants to increase dose. Denies any SE. Reports that she has been trying to working on her diet and way of cooking. Has been also trying to eat leaner and less red meats. Has been drinking almost 6 bottles of water daily. Is walking every single day. Has had some stress with her boyfriend being in out of the hospital. Is having some sleep trouble but has for years. Sleeps about 4 hours that are unbroken and often has to get up to urinate. Starting to decrease her fluids in the night and her caffeine.    Current measurements today are:   Weight Metrics 6/15/2022   Neck Circ (inches) 17.75   Waist Measure Inches 51.5   Body Fat % 50.6     Visit Vitals  /78 (BP 1 Location: Right arm, BP Patient Position: Sitting, BP Cuff Size: Adult long)   Pulse 99   Temp 98.4 °F (36.9 °C) (Oral)   Resp 18   Ht 5' (1.524 m)   Wt 292 lb 14.4 oz (132.9 kg)   LMP 05/27/2022 (Exact Date)   SpO2 98%   BMI 57.20 kg/m²     Past Medical History:   Diagnosis Date    Depression 05/11/2022    Difficulty swallowing 05/11/2022    Elevated blood pressure reading     GERD (gastroesophageal reflux disease) 05/11/2022    High cholesterol     Irregular periods/menstrual cycles     Kidney stone     Obesity 05/11/2022     Past Surgical History:   Procedure Laterality Date    HX UROLOGICAL  2016    kidney stone removed    [place] CYSTOSCOPY,INSERT URETERAL STENT  6.23.16    Dr. Pippa Fountain ([facility])     Family History   Problem Relation Age of Onset    Leukemia Mother     High Cholesterol Father  Hypertension Father     Colon Cancer Maternal Grandmother 80    Lung Cancer Maternal Uncle     High Cholesterol Brother     COPD Paternal Kaylynn Urias Alzheimer's Disease Maternal Grandfather      Outpatient Encounter Medications as of 6/15/2022   Medication Sig Dispense Refill    phentermine 30 mg capsule Take 1 Capsule by mouth every morning. Max Daily Amount: 30 mg. 30 Capsule 0    buPROPion XL (WELLBUTRIN XL) 150 mg tablet Take 1 Tablet by mouth daily. 90 Tablet 1    pantoprazole (PROTONIX) 20 mg tablet Take 1 Tablet by mouth daily. 90 Tablet 1    [DISCONTINUED] phentermine (ADIPEX_P) 15 mg capsule Take 1 Capsule by mouth every morning. Max Daily Amount: 15 mg. 30 Capsule 0     No facility-administered encounter medications on file as of 6/15/2022. HPI    Review of Systems   All other systems reviewed and are negative. Physical Exam  Vitals and nursing note reviewed. Constitutional:       Appearance: She is obese. Cardiovascular:      Rate and Rhythm: Normal rate and regular rhythm. Pulmonary:      Effort: Pulmonary effort is normal.      Breath sounds: Normal breath sounds. Abdominal:      Palpations: Abdomen is soft. Skin:     General: Skin is warm. Neurological:      Mental Status: She is alert and oriented to person, place, and time. Psychiatric:         Behavior: Behavior normal.         ASSESSMENT and PLAN  Diagnoses and all orders for this visit:    1. Morbid obesity due to excess calories (HCC)  -     phentermine 30 mg capsule; Take 1 Capsule by mouth every morning. Max Daily Amount: 30 mg.    2. Pure hypercholesterolemia    3.  Prediabetes      Follow-up and Dispositions    · Return in about 1 month (around 7/15/2022), or if symptoms worsen or fail to improve.       lab results and schedule of future lab studies reviewed with patient  reviewed diet, exercise and weight control  cardiovascular risk and specific lipid/LDL goals reviewed  reviewed medications and side effects in detail

## 2022-07-13 ENCOUNTER — OFFICE VISIT (OUTPATIENT)
Dept: SURGERY | Age: 42
End: 2022-07-13
Payer: COMMERCIAL

## 2022-07-13 VITALS
OXYGEN SATURATION: 96 % | HEART RATE: 95 BPM | RESPIRATION RATE: 20 BRPM | WEIGHT: 293 LBS | TEMPERATURE: 98.6 F | BODY MASS INDEX: 57.52 KG/M2 | SYSTOLIC BLOOD PRESSURE: 121 MMHG | DIASTOLIC BLOOD PRESSURE: 91 MMHG | HEIGHT: 60 IN

## 2022-07-13 DIAGNOSIS — K21.9 GASTROESOPHAGEAL REFLUX DISEASE, UNSPECIFIED WHETHER ESOPHAGITIS PRESENT: ICD-10-CM

## 2022-07-13 DIAGNOSIS — R73.03 PREDIABETES: ICD-10-CM

## 2022-07-13 DIAGNOSIS — E66.01 MORBID OBESITY DUE TO EXCESS CALORIES (HCC): Primary | ICD-10-CM

## 2022-07-13 DIAGNOSIS — E78.00 PURE HYPERCHOLESTEROLEMIA: ICD-10-CM

## 2022-07-13 PROCEDURE — 99214 OFFICE O/P EST MOD 30 MIN: CPT | Performed by: INTERNAL MEDICINE

## 2022-07-13 RX ORDER — PHENTERMINE HYDROCHLORIDE 30 MG/1
30 CAPSULE ORAL
Qty: 30 CAPSULE | Refills: 0 | Status: SHIPPED | OUTPATIENT
Start: 2022-07-13

## 2022-07-13 NOTE — PROGRESS NOTES
1. Have you been to the ER, urgent care clinic since your last visit? Hospitalized since your last visit? No    2. Have you seen or consulted any other health care providers outside of the [address] [place] [address] since your last visit? Include any pap smears or colon screening. No     Pt blood pressure is high. Pt is not on blood pressure medication. Pt denies blurry vision, dizziness, chest pain, arm pain, or weakness, and swelling legs. Reported BP to RENNY Bassett.

## 2022-07-13 NOTE — PROGRESS NOTES
HISTORY OF PRESENT ILLNESS  Joanne Wiggins is a 39 y.o. female. Patient is  female here for a follow up. PMH of pre DM, GERD. Patient is up 3 lbs since last visit. Is still taking phentermine 30 mg. No SE. Does feel like it helps with curve and suppression. After giving an idea of her foods weekly. She has now be scheduled with a dietician. Removed all white carbs from her diet. Also reports that she is having some left lower pelvic pain. Has made an GYN appointment. Is using NSAIDs and heat to help. No spotting. Hx of ruptured cyst.   Is walking a few times a week. Visit Vitals  BP (!) 121/91 (BP 1 Location: Right arm, BP Patient Position: Sitting, BP Cuff Size: Thigh)   Pulse 95   Temp 98.6 °F (37 °C)   Resp 20   Ht 5' (1.524 m)   Wt 295 lb 1.6 oz (133.9 kg)   LMP 06/22/2022 (Approximate)   SpO2 96%   BMI 57.63 kg/m²     Past Medical History:   Diagnosis Date    Depression 05/11/2022    Difficulty swallowing 05/11/2022    Elevated blood pressure reading     GERD (gastroesophageal reflux disease) 05/11/2022    High cholesterol     Irregular periods/menstrual cycles     Kidney stone     Obesity 05/11/2022     Past Surgical History:   Procedure Laterality Date    HX UROLOGICAL  2016    kidney stone removed    [place] CYSTOSCOPY,INSERT URETERAL STENT  6.23.16    Dr. Emiliana Delaney ([facility])     Family History   Problem Relation Age of Onset    Leukemia Mother     High Cholesterol Father     Hypertension Father     Colon Cancer Maternal Grandmother 80    Lung Cancer Maternal Uncle     High Cholesterol Brother     COPD Paternal Grandfather     Alzheimer's Disease Maternal Grandfather      Outpatient Encounter Medications as of 7/13/2022   Medication Sig Dispense Refill    phentermine 30 mg capsule Take 1 Capsule by mouth every morning. Max Daily Amount: 30 mg. 30 Capsule 0    buPROPion XL (WELLBUTRIN XL) 150 mg tablet Take 1 Tablet by mouth daily.  90 Tablet 1    pantoprazole (PROTONIX) 20 mg tablet Take 1 Tablet by mouth daily. 90 Tablet 1    [DISCONTINUED] phentermine 30 mg capsule Take 1 Capsule by mouth every morning. Max Daily Amount: 30 mg. 30 Capsule 0     No facility-administered encounter medications on file as of 7/13/2022. HPI    Review of Systems   Constitutional: Negative. Respiratory: Negative. Cardiovascular: Negative. Gastrointestinal: Negative for constipation and vomiting. Genitourinary:        Left lower pelvic pain   Neurological: Negative. Psychiatric/Behavioral: Negative. Physical Exam  Vitals and nursing note reviewed. Constitutional:       Appearance: She is obese. Cardiovascular:      Rate and Rhythm: Normal rate and regular rhythm. Pulmonary:      Effort: Pulmonary effort is normal.      Breath sounds: Normal breath sounds. Abdominal:      General: Bowel sounds are normal.      Palpations: Abdomen is soft. Comments: Pressure when palpated    Musculoskeletal:         General: Normal range of motion. Skin:     General: Skin is warm. Neurological:      Mental Status: She is alert and oriented to person, place, and time. Psychiatric:         Behavior: Behavior normal.         ASSESSMENT and PLAN  Diagnoses and all orders for this visit:    1. Morbid obesity due to excess calories (HCC)  -     phentermine 30 mg capsule; Take 1 Capsule by mouth every morning. Max Daily Amount: 30 mg.        -     Will see dietician         -     Begin educating are on fruit and vegetables. Reviewed carb level in some food items   2. Pure hypercholesterolemia    3. Prediabetes    4.  Gastroesophageal reflux disease, unspecified whether esophagitis present      Follow-up and Dispositions    · Return in about 1 month (around 8/13/2022), or if symptoms worsen or fail to improve.       lab results and schedule of future lab studies reviewed with patient  reviewed diet, exercise and weight control  reviewed medications and side effects in detail

## 2022-08-10 ENCOUNTER — HOSPITAL ENCOUNTER (OUTPATIENT)
Dept: NUTRITION | Age: 42
Discharge: HOME | End: 2022-08-10
Payer: COMMERCIAL

## 2022-08-10 PROCEDURE — 97802 MEDICAL NUTRITION INDIV IN: CPT | Performed by: DIETITIAN, REGISTERED

## 2022-08-10 NOTE — PROGRESS NOTES
[id number] [facility]     Nutrition Assessment - Medical Nutrition Therapy   Outpatient Initial Evaluation         Patient Name: Caridad Muse : 1980   Treatment Diagnosis: E66.01 (ICD-10-CM) - Morbid obesity due to excess calories [facility])   Referral Source: Governor Cornel NP Start of Care [facility]): 8/10/2022     In time:   2:30-m             Out time:   3:30pm   Total Treatment Time (min):   60     Gender: female Age: 39 y.o. Ht: 60 in Wt:  296.2 lb  kg   BMI: 57.8 BMR   Male  BMR Female 1930     Past Medical History:  Patient Active Problem List   Diagnosis Code    Elevated BP without diagnosis of hypertension R03.0    Morbid obesity due to excess calories (HCC) E66.01    Pure hypercholesterolemia E78.00    Prediabetes R73.03    Gastroesophageal reflux disease K21.9    Moderate episode of recurrent major depressive disorder (HCC) F33.1    Dysphagia R13.10        Pertinent Medications:     Current Outpatient Medications:     phentermine 30 mg capsule, Take 1 Capsule by mouth every morning. Max Daily Amount: 30 mg., Disp: 30 Capsule, Rfl: 0    buPROPion XL (WELLBUTRIN XL) 150 mg tablet, Take 1 Tablet by mouth daily. , Disp: 90 Tablet, Rfl: 1    pantoprazole (PROTONIX) 20 mg tablet, Take 1 Tablet by mouth daily. , Disp: 90 Tablet, Rfl: 1     Biochemical Data:   Lab Results   Component Value Date/Time    Hemoglobin A1c 5.8 (H) 2022 12:00 AM     BP Readings from Last 3 Encounters:   22 (!) 121/91   06/15/22 120/78   22 126/80     Lab Results   Component Value Date/Time    Sodium 140 2022 12:00 AM    Potassium 4.5 2022 12:00 AM    Chloride 102 2022 12:00 AM    CO2 23 2022 12:00 AM    Anion gap 9 2016 11:49 AM    Glucose 89 2022 12:00 AM    BUN 14 2022 12:00 AM    Creatinine 0.65 2022 12:00 AM    BUN/Creatinine ratio 22 2022 12:00 AM    GFR est  2022 09:18 AM    GFR est non- 2022 09:18 AM    Calcium 9.2 06/07/2022 12:00 AM    Bilirubin, total 0.2 06/07/2022 12:00 AM    Alk. phosphatase 83 06/07/2022 12:00 AM    Protein, total 7.4 06/07/2022 12:00 AM    Albumin 4.2 06/07/2022 12:00 AM    Globulin 4.7 (H) 06/18/2016 11:49 AM    A-G Ratio 1.3 06/07/2022 12:00 AM    ALT (SGPT) 12 06/07/2022 12:00 AM    AST (SGOT) 11 06/07/2022 12:00 AM     Lab Results   Component Value Date/Time    Cholesterol, total 204 (H) 06/07/2022 12:00 AM    HDL Cholesterol 54 06/07/2022 12:00 AM    LDL, calculated 132 (H) 06/07/2022 12:00 AM    VLDL, calculated 18 06/07/2022 12:00 AM    Triglyceride 103 06/07/2022 12:00 AM        Assessment:   Pt here for help with weight loss. Notable A1C in prediabetic range. Elevated lipids. Hiostory of high blood pressure. Decreased appetite due to phentermine. On Wellbutrin for a few months. No appetite changes. Has trouble sleeping. Interrupted sleep waking up at 2-3am. Able to fall back asleep after a while. Works in security. More walking now. And walking on lunch break. Otherwise mostly sedentary. Clean plate club. Raised to eat all her food before leaving the table. Notes working to slow down eating speed and pay more attention to her food. Increased activity. Walking for exercise 4-5 times per week (1-1.5 miles). Tried to do some other exercising. Feeling better and clothing fit better. Some bloating around menstrual cycle but less than before. Notes scale sometimes goes up and down. Increased water from none to 3-4 bottles per day. Quit sodas over the past 1-1.5 years. Previously 2-3 x 16 oz. Quit coffee  Notes weekends are hardest time. Notes when cutting out sweets cold turkey noted binging. Getting out of the house helps. None recently. Some feeling of out of control. History of kidney stone in 2016.      Wt Readings from Last 10 Encounters:   07/13/22 133.9 kg (295 lb 1.6 oz)   06/15/22 132.9 kg (292 lb 14.4 oz)   06/07/22 134.8 kg (297 lb 3.2 oz) 05/11/22 136.3 kg (300 lb 6.4 oz)   02/08/22 132.6 kg (292 lb 4 oz)   01/05/22 129.8 kg (286 lb 2 oz)   11/04/21 124.9 kg (275 lb 6 oz)   10/21/19 137.4 kg (303 lb)   06/30/16 126.6 kg (279 lb)   06/20/16 128.1 kg (282 lb 8 oz)   (     Food & Nutrition: Drinks: occasional green tea (0 kcal), water, raspberry tea (0 kcal), no juice (previously drinking OJ), no slushies/slurpies/milkshakes (1 milkshakes in past month). Previously was 1-2 per week or more. Tried various artificial sweeteners but didn't like any. Increaed intake of fruits and vegetables. Previously rare. But pt is concerned about the carbohydrates in fruit. Eating Patterns  How many meals a day do you generally eat? 2-3 3  Do you skip meals? yes  If yes, which ones? Breakfast (eggs or oatmeal with fruit) makes 2-3 times per week. If yes, why? Just not eating. Running around. Time constraint. Do you eat snacks between meals? yes  If yes, typical snacks: sometimes. Crackers (saltines. Trying to get lower sodium ones, sometimes sunflower seeds, rare peanuts. Previously much more. Lunch- salad (grilled chicken, cheese, cuscus, sunflower seeds, fat free ranch from salad bar) or quesadilla (carb balance tortilla - 1 trotilla the 12\" with chicken and cheese)   Dinner - chicken (skinless grilled or baked) /fish/steak/hamburger. Tried using less bread. Notes fried foods hurt her stomachs. Used to eat much more. S- none. Milk hurts her stomach. Cut back on sweets - cookies, cupcakes. Trying to only have 1-2 times per week and only 1 serving. Trying to cut them out all together. Notes many of these changes were made while doing HCG shots for weight loss with a 500 kcal diet.         Estimate Needs:    Equation( [x] MSJ ; []  HBE; [] Rosa Bonmicha; [] other)  * Activity Factor (1.3) -500   Calories: 2000 Protein: 110 Carbs: 240 Fat: 67   Kcal/day  g/day  g/day  g/day        percent: 22  50  30               Nutrition Diagnosis Food and nutrition related knowledge deficit R/T lack of prior education for healthy weight loss nutrition AEB request for counseling    Elevated nutrition related lab value (LDL Cholesterol) R/T history of excessive saturated fat intake, inadequate fiber intake AEB LDL of 132mg/dl, diet history of eating fried foods, processed foods, minimal fruits and vegetables. Nutrition Intervention &  Education: Educated pt on the pathophysiology of pre-iabetes, insulin resistance and the rationale for dietary modifications and increased activity. Educated pt on lean proteins, healthy fats, non-starchy vegetables, and complex carbohydrate food sources. Discussed limiting carbohydrates, label reading, meal timing, and appropriate serving sizes. Encouraged pt to avoid sugary beverages. Set exercise goals. Recommend increaing water intake. Used plate method to plan meals.     Handouts Provided: []  Carbohydrates  []  Protein  []  Non-starchy Vegatbles  []  Food Label  []  Meal and Snack Ideas  []  Food Journals []  Diabetes  []  Cholesterol  []  Sodium  []  Gen Nutr Guidelines  []  SBGM Guidelines  [x]  Others: balanced plate   Information Reviewed with: pt   Readiness to Change Stage: []  Pre-contemplative    []  Contemplative  [x]  Preparation               []  Action                  []  Maintenance   Potential Barriers to Learning: []  Decline in memory    []  Language barrier   []  Other:  []  Emotional                  []  Limited mobility     [x]  None  []  Lack of motivation     [] Vision, hearing or cognitive impairment   Expected Compliance: Good due to motivation expressed     Nutritional Goal - To promote lifestyle changes to result in:    [x]  Weight loss  []  Improved diabetic control  []  Decreased cholesterol levels  []  Decreased blood pressure  []  Weight maintenance []  Preventing any interactions associated with food allergies  []  Adequate weight gain toward goal weight  []  Other:        Patient Goals:   - Improve consistency of CHO intake w/goal to plan meals using balanced plate for 3 meals per day. 1 fist of carbohydrate as target    - Improve physical activity w/goal to increase exercise as able. Add 1 more day of activity    - add 1 more water per day.       Dietitian Signature: Macie Barrow MS, RD, CSSD Date: 8/10/2022   Follow-up: 4 weeks Time: 2:42 PM

## 2022-09-07 ENCOUNTER — HOSPITAL ENCOUNTER (OUTPATIENT)
Dept: NUTRITION | Age: 42
Discharge: HOME | End: 2022-09-07
Payer: COMMERCIAL

## 2022-09-07 PROCEDURE — 97803 MED NUTRITION INDIV SUBSEQ: CPT | Performed by: DIETITIAN, REGISTERED

## 2022-09-07 NOTE — PROGRESS NOTES
NUTRITION - FOLLOW-UP TREATMENT NOTE  Patient Name: Clara Rodriguez         Date: 2022  : 1980    YES Patient  Verified  Diagnosis:    E66.01 (ICD-10-CM) - Morbid obesity due to excess calories ([address]      In time:   3:15pm        Out time:   3:45pm   Total Treatment Time (min):  30     SUBJECTIVE/ASSESSMENT  Current Wt: 292.8 Previous Wt: 296.2 Wt Change: -4.4     Initial Wt: 296.2 Total Wt change: -4.4 Height: 60     Changes in medication or medical history? Any new allergies, surgeries or procedures? No    If yes, update Summary List             Nutrition Diagnosis        Diagnosis Status: Food and nutrition related knowledge deficit R/T lack of prior education for healthy weight loss nutrition AEB request for counseling     [x]  Improved []  No Change    []  Declined   []  Discontinued       Elevated nutrition related lab value (LDL Cholesterol) R/T history of excessive saturated fat intake, inadequate fiber intake AEB LDL of 132mg/dl, diet history of eating fried foods, processed foods, minimal fruits and vegetables. [x]  Improved []  No Change    []  Declined   []  Discontinued        Nutrition Monitoring and Evaluation: Started counting carbs instead of calories. Cut out pork and trejo from diet. Generally 2-3 meals per day. Fruit sand vegetables daily. Limiting hamburgers and not eating with bread. Still using low carb wraps instead of bread. Not eating out often. Switched to soy milk instead of regular milk. Notes funny feeling in her throat. Walking for 20min in the evenings. Added in arms and legs work. And more work at lunch. Getting 6k steps per day now. Trying to increase towards 12k. Reduced the amount of cookies and cakes in her diet for past 3-4 week. Buying smaller paper plates. Food looks like more. Working on eating slower. Noticing she feels holliday longer after. Using protein drinks 1 per day or none. Also having zero calorie raspberry tea. Previous goals:  Met. - Improve consistency of CHO intake w/goal to plan meals using balanced plate for 3 meals per day. 1 fist of carbohydrate as target  Met. - Improve physical activity w/goal to increase exercise as able. Add 1 more day of activity   improved. - add 1 more water per day. Nutrition Prescription and  Intervention Goal setting  Reviewed carb counting, label reading  Exercise goals around increasing towards 30-60min per day       Patient Education:  []  Review current plan with patient   []  Other:    Handouts/  Information Provided: []  Carbohydrates  []  Protein  []  Fiber  []  Serving Sizes  []  Fluids  []  General guidelines []  Diabetes  []  Cholesterol  []  Sodium  []  SBGM  []  Food Journals  []  Others:      Patient Goals -continue improved CHO consistency with balanced plate (30-63O total carb per meal as guideance)  -improve physical activity with goal to be active or 30-60min daily  -improve water intake with sipping over the day.         PLAN  [x]  Continue on current plan []  Follow-up PRN   []  Discharge due to :    [x]  Next appt: 1 month     Dietitian: Andrés Tovar MS, RD, CSSD    Date: 9/7/2022 Time: 3:24 PM

## 2022-10-17 ENCOUNTER — HOSPITAL ENCOUNTER (OUTPATIENT)
Dept: NUTRITION | Age: 42
Discharge: HOME | End: 2022-10-17
Payer: COMMERCIAL

## 2022-10-17 PROCEDURE — 97803 MED NUTRITION INDIV SUBSEQ: CPT | Performed by: DIETITIAN, REGISTERED

## 2022-10-17 NOTE — PROGRESS NOTES
NUTRITION - FOLLOW-UP TREATMENT NOTE  Patient Name: Michelle Zhao         Date: 10/17/2022  : 1980    YES Patient  Verified  Diagnosis: prediabets, manjinder cholesterol,    E66.01 (ICD-10-CM) - Morbid obesity due to excess calories ([address]      In time:   1:30pm       Out time:   2:00pm   Total Treatment Time (min):  30     SUBJECTIVE/ASSESSMENT  Current Wt: 300.0 Previous Wt: 292.8 Wt Change: +7.2     Initial Wt: 296.2 Total Wt change: +4. Height: 60     Changes in medication or medical history? Any new allergies, surgeries or procedures? No    If yes, update Summary List   Lab Results   Component Value Date/Time    Hemoglobin A1c 5.8 (H) 2022 12:00 AM     Lab Results   Component Value Date/Time    Cholesterol, total 204 (H) 2022 12:00 AM    HDL Cholesterol 54 2022 12:00 AM    LDL, calculated 132 (H) 2022 12:00 AM    VLDL, calculated 18 2022 12:00 AM    Triglyceride 103 2022 12:00 AM     BP Readings from Last 3 Encounters:   22 (!) 121/91   06/15/22 120/78   22 126/80     No results found for: VITD3, XQVID2, XQVID3, XQVID, VD3RIA, [id number], J31SRXC    No results found for: IRON, FE, TIBC, IBCT, PSAT, FERR  Lab Results   Component Value Date/Time    TSH 1.180 2022 09:18 AM          Nutrition Diagnosis        Diagnosis Status: New: Food and nutrition related knowledge deficit R/T lack of prior education for  GERD triggers and behavioral management AEB pt report of burning sensation when consuming tomatoes and citrus items, reports choking on water as well, history of GERD diagnosis.      Food and nutrition related knowledge deficit R/T lack of prior education for healthy weight loss nutrition AEB request for counseling     [x]  Improved []  No Change    []  Declined   []  Discontinued       Elevated nutrition related lab value (LDL Cholesterol) R/T history of excessive saturated fat intake, inadequate fiber intake AEB LDL of 132mg/dl, diet history of eating fried foods, processed foods, minimal fruits and vegetables. [x]  Improved []  No Change    []  Declined   []  Discontinued        Nutrition Monitoring and Evaluation: Hip pain is limiting her from being able to go to the gym. Notes choking on water sine last visit. Feels like her throat is tight. If eating very small bites the is able to limit choking. Using protein shakes 2 times per week, sometimes 3. Added in egg whites mixed with 1 regular eggs fror breakfast.   Cut back on past (mostly due to swallowing issues). Some burning feeling going down with pasta and tomotoes / sauce. Sometimes taking tumms. Some help but not completes solved. Notes GERD symptoms. Already waiting 3 hours between eating and bed. Possibly swollen ankes / feet. Pt notes no issue and no difference than usual.   Notable for weight fluctuations without pt report of dietary changes. Previous goals:  Met. 30g per meal based on reported portions. -continue improved CHO consistency with balanced plate (74-47V total carb per meal as guideance)  Less due to hip pain. -improve physical activity with goal to be active or 30-60min daily  Met. -improve water intake with sipping over the day. Nutrition Prescription and  Intervention Goal setting  Reviewed carb counting, label reading  Exercise goals around increasing towards 30-60min per day. Adjusted for hip pain. Educated on GERD Nutrition. Patient Education:  []  Review current plan with patient   []  Other:    Handouts/  Information Provided: []  Carbohydrates  []  Protein  []  Fiber  []  Serving Sizes  []  Fluids  []  General guidelines []  Diabetes  []  Cholesterol  []  Sodium  []  SBGM  []  Food Journals  []  Others:      Patient Goals -continue improved CHO consistency with balanced plate (33-87A total carb per meal as guideance)  -improve physical activity with goal to be active or 30-60min daily  -improve water intake with sipping over the day. -GERD: reduce intake of high fat foods including fried, nuts, high fat meats. Reduce intake of mints and pepper added to foods.  Reduce intake of tomatoes and citrus fruits.   -keep food log and bring to next session (3 days per week)       PLAN  [x]  Continue on current plan []  Follow-up PRN   []  Discharge due to :    [x]  Next appt: 1 month     Dietitian: Vijay Perez MS, RD, CSSD    Date: 10/17/2022 Time: 3:24 PM

## 2022-11-08 ENCOUNTER — OFFICE VISIT (OUTPATIENT)
Dept: FAMILY MEDICINE CLINIC | Age: 42
End: 2022-11-08
Payer: COMMERCIAL

## 2022-11-08 VITALS
TEMPERATURE: 98.1 F | SYSTOLIC BLOOD PRESSURE: 143 MMHG | HEART RATE: 89 BPM | OXYGEN SATURATION: 98 % | DIASTOLIC BLOOD PRESSURE: 90 MMHG | BODY MASS INDEX: 50.02 KG/M2 | HEIGHT: 64 IN | WEIGHT: 293 LBS

## 2022-11-08 DIAGNOSIS — R03.0 ELEVATED BP WITHOUT DIAGNOSIS OF HYPERTENSION: ICD-10-CM

## 2022-11-08 DIAGNOSIS — R20.2 NUMBNESS AND TINGLING IN LEFT HAND: ICD-10-CM

## 2022-11-08 DIAGNOSIS — R20.0 NUMBNESS AND TINGLING IN LEFT HAND: ICD-10-CM

## 2022-11-08 DIAGNOSIS — M54.50 ACUTE MIDLINE LOW BACK PAIN WITHOUT SCIATICA: ICD-10-CM

## 2022-11-08 DIAGNOSIS — M25.552 LEFT HIP PAIN: Primary | ICD-10-CM

## 2022-11-08 PROCEDURE — 99214 OFFICE O/P EST MOD 30 MIN: CPT | Performed by: STUDENT IN AN ORGANIZED HEALTH CARE EDUCATION/TRAINING PROGRAM

## 2022-11-08 RX ORDER — NAPROXEN 500 MG/1
500 TABLET ORAL 2 TIMES DAILY WITH MEALS
Qty: 28 TABLET | Refills: 0 | Status: SHIPPED | OUTPATIENT
Start: 2022-11-08 | End: 2022-11-22

## 2022-11-08 NOTE — PATIENT INSTRUCTIONS
Anti-inflammatory medication - Naprosyn  Use ice for 20 minutes before and after activity that makes it worse  Try heat too, see what feels best to you. Activity: low impact, aqua based, recumbent cycling, chair based exercises    You should check your blood pressure at home. Check first thing in the morning. You should be relaxed, seated with back supported, feet flat on the floor, arm with cuff resting at heart height. You should check your blood pressure 1-3 times. Please write down your blood pressures and bring this record and your blood pressure cuff/machine to your follow-up visit. I would like for your blood pressure to be less than 130 for the top number and less than 90 for the bottom number. Please follow-up sooner for consistently high blood pressure readings at home. Send Dr. Sameer Randall a [employer] message in 1 week with blood pressure readings.

## 2022-11-08 NOTE — PROGRESS NOTES
Progress Note    she is a 39y.o. year old female who presents for evalution. Assessment/ Plan:   Diagnoses and all orders for this visit:    1. Left hip pain  2. Acute midline low back pain without sciatica  NSAIDs, trial heat and ice  -     XR SPINE LUMB 2 OR 3 V; Future  -     REFERRAL TO PHYSICAL THERAPY  -     naproxen (NAPROSYN) 500 mg tablet; Take 1 Tablet by mouth two (2) times daily (with meals) for 14 days. 3. Numbness and tingling in left hand  -     REFERRAL TO ORTHOPEDICS    4. Elevated BP without diagnosis of hypertension   Encouraged home bp monitoring, communicate via [employer]      Follow-up and Dispositions    Return in about 2 months (around 1/8/2023) for follow-up pain. I have discussed the diagnosis with the patient and the intended plan as seen in the above orders. The patient has received an after-visit summary and questions were answered concerning future plans. Pt conveyed understanding of plan. Medication Side Effects and Warnings were discussed with patient        Subjective:     Chief Complaint   Patient presents with    Hip Pain     Goes down her (L) hip to her leg. Reports as a sharp pain. Pain occurs with movement. Impeding her exercising    Leg Pain    Numbness     X1 week  hand has been falling asleep     Back Pain     Feels a \"pull' when she stretches forward      Hasn't been back to weight loss clinic  Has continued working with nutritionist, Lamar Snyder. Given some chair exercises  Hoping to get more physically active before going back to the phentermine.     Last few months small pain in the L hip  Outer aspect of hip  Increasing sharpness since onset  Pain radiates down her leg  Some numbness in the top part of her leg  No weakness  Comes on with short distance walking; also evening and night  No previous evaluation  Has taken Advil, helped her sleep    Left hand feeling tingly / falling asleep  Goes away on its own  Worse with writing and typing,   No issues on the right hand except random pains. Last 1-1.5 month, other pains have come up  Lower back new in the last week   Worse with bending forward   Not worse with walking      Reviewed PmHx, RxHx, FmHx, SocHx, AllgHx and updated and dated in the chart. Review of Systems - negative except as listed above in the HPI    Objective:     Vitals:    11/08/22 1511   BP: (!) 143/90   Pulse: 89   Temp: 98.1 °F (36.7 °C)   SpO2: 98%   Weight: 302 lb 11.2 oz (137.3 kg)   Height: 5' 4\" (1.626 m)       Current Outpatient Medications   Medication Sig    buPROPion XL (WELLBUTRIN XL) 150 mg tablet Take 1 Tablet by mouth daily. pantoprazole (PROTONIX) 20 mg tablet Take 1 Tablet by mouth daily. lisinopriL (PRINIVIL, ZESTRIL) 10 mg tablet Take 1 Tablet by mouth daily. phentermine 30 mg capsule Take 1 Capsule by mouth every morning. Max Daily Amount: 30 mg. (Patient not taking: No sig reported)     No current facility-administered medications for this visit. Physical Exam  Vitals and nursing note reviewed. Constitutional:       General: She is not in acute distress. Appearance: Normal appearance. She is not ill-appearing, toxic-appearing or diaphoretic. HENT:      Head: Normocephalic and atraumatic. Eyes:      General: No scleral icterus. Right eye: No discharge. Left eye: No discharge. Conjunctiva/sclera: Conjunctivae normal.   Pulmonary:      Effort: Pulmonary effort is normal. No respiratory distress. Musculoskeletal:         General: No swelling or deformity. Cervical back: No rigidity. Right lower leg: No edema. Left lower leg: No edema. Skin:     General: Skin is warm and dry. Coloration: Skin is not jaundiced. Findings: No bruising or rash (over visualized areas). Neurological:      General: No focal deficit present. Mental Status: She is alert. Motor: No weakness.    Psychiatric:         Mood and Affect: Mood normal. Behavior: Behavior normal.         Thought Content:  Thought content normal.         Judgment: Judgment normal.            Craig Barajas MD

## 2022-11-08 NOTE — PROGRESS NOTES
Giovanny Hope is a 39 y.o. female , id x 2(name and ). Reviewed record, history, and  medications. Chief Complaint   Patient presents with    Hip Pain     Goes down her (L) hip to her leg. Reports as a sharp pain. Pain occurs with movement. Impeding her exercising    Leg Pain    Numbness     X1 week  hand has been falling asleep     Back Pain     Feels a \"pull' when she stretches forward        Vitals:    22 1511   BP: (!) 143/90   Pulse: 89   Temp: 98.1 °F (36.7 °C)   SpO2: 98%   Weight: 302 lb 11.2 oz (137.3 kg)   Height: 5' 4\" (1.626 m)       Coordination of Care Questionnaire:   1. Have you been to the ER, urgent care clinic since your last visit? Hospitalized since your last visit? No    2. Have you seen or consulted any other health care providers outside of the [address] [place] [address] since your last visit? Include any pap smears or colon screening. No      3 most recent PHQ Screens 2022   Little interest or pleasure in doing things Not at all   Feeling down, depressed, irritable, or hopeless Several days   Total Score PHQ 2 1   Trouble falling or staying asleep, or sleeping too much -   Feeling tired or having little energy -   Poor appetite, weight loss, or overeating -   Feeling bad about yourself - or that you are a failure or have let yourself or your family down -   Trouble concentrating on things such as school, work, reading, or watching TV -   Moving or speaking so slowly that other people could have noticed; or the opposite being so fidgety that others notice -   Thoughts of being better off dead, or hurting yourself in some way -   PHQ 9 Score -   How difficult have these problems made it for you to do your work, take care of your home and get along with others -       Patient is accompanied by self I have received verbal consent from Giovanny Hope to discuss any/all medical information while they are present in the room.

## 2022-11-16 ENCOUNTER — HOSPITAL ENCOUNTER (OUTPATIENT)
Age: 42
Setting detail: OUTPATIENT SURGERY
Discharge: HOME | End: 2022-11-16
Attending: SPECIALIST | Admitting: SPECIALIST
Payer: COMMERCIAL

## 2022-11-16 ENCOUNTER — ANESTHESIA (OUTPATIENT)
Dept: ENDOSCOPY | Age: 42
End: 2022-11-16
Payer: COMMERCIAL

## 2022-11-16 ENCOUNTER — ANESTHESIA EVENT (OUTPATIENT)
Dept: ENDOSCOPY | Age: 42
End: 2022-11-16
Payer: COMMERCIAL

## 2022-11-16 VITALS
RESPIRATION RATE: 19 BRPM | WEIGHT: 293 LBS | BODY MASS INDEX: 57.52 KG/M2 | HEART RATE: 97 BPM | OXYGEN SATURATION: 95 % | SYSTOLIC BLOOD PRESSURE: 154 MMHG | HEIGHT: 60 IN | DIASTOLIC BLOOD PRESSURE: 70 MMHG | TEMPERATURE: 98.3 F

## 2022-11-16 PROCEDURE — 77030018712 HC DEV BLLN INFL BSC -B: Performed by: SPECIALIST

## 2022-11-16 PROCEDURE — 77030009426 HC FCPS BIOP ENDOSC BSC -B: Performed by: SPECIALIST

## 2022-11-16 PROCEDURE — 2709999900 HC NON-CHARGEABLE SUPPLY: Performed by: SPECIALIST

## 2022-11-16 PROCEDURE — C1726 CATH, BAL DIL, NON-VASCULAR: HCPCS | Performed by: SPECIALIST

## 2022-11-16 PROCEDURE — 76060000031 HC ANESTHESIA FIRST 0.5 HR: Performed by: SPECIALIST

## 2022-11-16 PROCEDURE — 74011250636 HC RX REV CODE- 250/636: Performed by: NURSE ANESTHETIST, CERTIFIED REGISTERED

## 2022-11-16 PROCEDURE — 76040000019: Performed by: SPECIALIST

## 2022-11-16 PROCEDURE — 74011000250 HC RX REV CODE- 250: Performed by: NURSE ANESTHETIST, CERTIFIED REGISTERED

## 2022-11-16 PROCEDURE — 88305 TISSUE EXAM BY PATHOLOGIST: CPT

## 2022-11-16 RX ORDER — SODIUM CHLORIDE 9 MG/ML
50 INJECTION, SOLUTION INTRAVENOUS CONTINUOUS
Status: DISCONTINUED | OUTPATIENT
Start: 2022-11-16 | End: 2022-11-16 | Stop reason: HOSPADM

## 2022-11-16 RX ORDER — NALOXONE HYDROCHLORIDE 0.4 MG/ML
0.4 INJECTION, SOLUTION INTRAMUSCULAR; INTRAVENOUS; SUBCUTANEOUS
Status: DISCONTINUED | OUTPATIENT
Start: 2022-11-16 | End: 2022-11-16 | Stop reason: HOSPADM

## 2022-11-16 RX ORDER — DEXTROMETHORPHAN/PSEUDOEPHED 2.5-7.5/.8
1.2 DROPS ORAL
Status: DISCONTINUED | OUTPATIENT
Start: 2022-11-16 | End: 2022-11-16 | Stop reason: HOSPADM

## 2022-11-16 RX ORDER — FENTANYL CITRATE 50 UG/ML
INJECTION, SOLUTION INTRAMUSCULAR; INTRAVENOUS AS NEEDED
Status: DISCONTINUED | OUTPATIENT
Start: 2022-11-16 | End: 2022-11-16 | Stop reason: HOSPADM

## 2022-11-16 RX ORDER — MIDAZOLAM HYDROCHLORIDE 1 MG/ML
.25-5 INJECTION, SOLUTION INTRAMUSCULAR; INTRAVENOUS AS NEEDED
Status: DISCONTINUED | OUTPATIENT
Start: 2022-11-16 | End: 2022-11-16 | Stop reason: HOSPADM

## 2022-11-16 RX ORDER — FENTANYL CITRATE 50 UG/ML
25 INJECTION, SOLUTION INTRAMUSCULAR; INTRAVENOUS AS NEEDED
Status: DISCONTINUED | OUTPATIENT
Start: 2022-11-16 | End: 2022-11-16 | Stop reason: HOSPADM

## 2022-11-16 RX ORDER — PROPOFOL 10 MG/ML
INJECTION, EMULSION INTRAVENOUS
Status: DISCONTINUED | OUTPATIENT
Start: 2022-11-16 | End: 2022-11-16 | Stop reason: HOSPADM

## 2022-11-16 RX ORDER — FLUMAZENIL 0.1 MG/ML
0.2 INJECTION INTRAVENOUS
Status: DISCONTINUED | OUTPATIENT
Start: 2022-11-16 | End: 2022-11-16 | Stop reason: HOSPADM

## 2022-11-16 RX ORDER — PROPOFOL 10 MG/ML
INJECTION, EMULSION INTRAVENOUS AS NEEDED
Status: DISCONTINUED | OUTPATIENT
Start: 2022-11-16 | End: 2022-11-16 | Stop reason: HOSPADM

## 2022-11-16 RX ORDER — LIDOCAINE HYDROCHLORIDE 20 MG/ML
INJECTION, SOLUTION EPIDURAL; INFILTRATION; INTRACAUDAL; PERINEURAL AS NEEDED
Status: DISCONTINUED | OUTPATIENT
Start: 2022-11-16 | End: 2022-11-16 | Stop reason: HOSPADM

## 2022-11-16 RX ORDER — SODIUM CHLORIDE 9 MG/ML
INJECTION, SOLUTION INTRAVENOUS
Status: DISCONTINUED | OUTPATIENT
Start: 2022-11-16 | End: 2022-11-16 | Stop reason: HOSPADM

## 2022-11-16 RX ADMIN — PROPOFOL 50 MG: 10 INJECTION, EMULSION INTRAVENOUS at 08:55

## 2022-11-16 RX ADMIN — SODIUM CHLORIDE: 9 INJECTION, SOLUTION INTRAVENOUS at 08:19

## 2022-11-16 RX ADMIN — FENTANYL CITRATE 50 MCG: 50 INJECTION, SOLUTION INTRAMUSCULAR; INTRAVENOUS at 08:55

## 2022-11-16 RX ADMIN — PROPOFOL 175 MCG/KG/MIN: 10 INJECTION, EMULSION INTRAVENOUS at 08:55

## 2022-11-16 RX ADMIN — FENTANYL CITRATE 50 MCG: 50 INJECTION, SOLUTION INTRAMUSCULAR; INTRAVENOUS at 08:57

## 2022-11-16 RX ADMIN — LIDOCAINE HYDROCHLORIDE 80 MG: 20 INJECTION, SOLUTION EPIDURAL; INFILTRATION; INTRACAUDAL; PERINEURAL at 08:55

## 2022-11-16 NOTE — H&P
Date: 2022 2:00 PM  Patient Name: Deandra Dc  Account #: [id number]  Gender: Female   (age): 1980 (41)  Provider:    Davide Dillon. Ayan Lopez MD     Referring Physician:    Elodia Clarke  [address] [place] Conner Krishnan [facility]  [phone] (phone)  [phone] (fax)    Giuseppe Griffin  [address] [place], [address]  [phone] (phone)  [phone] (fax)     Chief Complaint:    I have food get stuck sometimes. History of Present Illness: The patient is seen for evaluation of dysphagia. Symptoms started several months ago. Difficulty with swallowing has occurred intermittently with solids. Symptoms have been stable without progression or worsening. Food seems to get stuck in the suprasternal area. Dysphagia occurs at least once weekly. Associated complaints include just dysphagia. Past Medical History  Medical Conditions:   High blood pressure  Surgical Procedures:   Stent for kidney stone  Dx Studies:   No Prior Diagnostic Studies  Medications:   bupropion HCl 150 mg Take 1 tablet by mouth once a day  pantoprazole 20 mg Take 1 tablet by mouth once a day  phentermine 15 mg Take 1 capsule by mouth once a day  Allergies:   Penicillins  Immunizations:   Influenza, seasonal, injectable (refused)  Influenza, seasonal, injectable (refused)  Social History  Alcohol:   None  Alcohol consumption: Monthly times a year. Tobacco:   Never smoker  Drugs:   None  Exercise:   Exercise 3 or more times a week 3 times a week. Caffeine:   Weekly. Occupation:         Family History   No history of Colon Polyps, Esophogeal Cancer, GI Cancers, IBD (Crohn's or UC), Liver disease  Grandmother:  Grandmother: Diagnosed with Family history of colon cancer;  Review of Systems:  Cardiovascular: Denies chest pain, irregular heart beat, palpitations, peripheral edema, syncope, Sweats.   Constitutional: Denies fatigue, fever, loss of appetite, weight gain, weight loss. ENMT: Denies nose bleeds, sore throat, hearing loss. Endocrine: Denies excessive thirst, heat intolerance. Eyes: Denies loss of vision. Gastrointestinal: Presents suffers from heartburn, dysphagia. Denies abdominal pain, abdominal swelling, change in bowel habits, constipation, diarrhea, Bloating/gas, jaundice, nausea, rectal bleeding, stomach cramps, vomiting, rectal pain, Stool incontinence, hematemesis. Genitourinary: Denies dark urine, dysuria, frequent urination, hematuria, incontinence. Hematologic/Lymphatic: Denies easy bruising, prolonged bleeding. Integumentary: Denies itching, rashes, sun sensitivity. Musculoskeletal: Denies arthritis, back pain, gout, joint pain, muscle weakness, stiffness. Neurological: Denies dizziness, fainting, frequent headaches, memory loss. Psychiatric: Presents suffers from depression, difficulty sleeping. Denies anxiety, hallucinations, nervousness, panic attacks, paranoia. Respiratory: Denies cough, dyspnea, wheezing. Vital Signs:  BP  (mmHg)  Pulse  (bpm) Rhythm Weight (lbs/oz) Height (ft/in) BMI Temp  145/104 99 Regular 296 / 6 5 / 0 57.88 97.2 (F)  Physical Exam:  Constitutional:  Appearance: No distress, appears comfortable. Communication: Understands/receives spoken information. Skin:  Inspection: No rash, no jaundice. Head/face: Inspection: Normacephalic, atraumatic. Eyes:  Conjunctivae/lids: Normal.  Pupils/irises: Pupils equal, round and normal.  ENMT:  External: Normal.  Hearing: Normal.  Neck:  Neck: Normal appearance, trachea midline. Respiratory:  Effort: Normal respiratory effort, comfortable, speaks in complete sentences. Lab Results:   No Electronic Results  Impressions:   Esophageal dysphagia  Plan:   Education handout on Hypertension  Education handout on BMI Healthy Weight  Upper Endoscopy -  [facility]. Risk & Medical Necessity:    The level of medical decision making for this visit is low.  The number and complexity of problems addressed are moderate. The risk of complications and/or morbidity or mortality of patient management is low. Luis Daniel Galvan. Lety Alvarez MD    Electronically signed on 2022 2:14:54 PM by Luis Daniel Galvan.  Lety Alvarez MD  [place] Mates, MRN [id number],  1980 First Visit, 2022

## 2022-11-16 NOTE — DISCHARGE INSTRUCTIONS
[address] [facility] LISA Moreno MD  [phone]      November 16, 2022     Maday Kwan  YOB: 1980    ENDOSCOPY DISCHARGE INSTRUCTIONS    If there is redness at IV site you should apply warm compress to area. If redness or soreness persist contact Dr. Jerry Moreno' or your primary care doctor. Gaseous discomfort may develop, but walking, belching will help relieve this. You may not operate a vehicle for 12 hours  You may not operate machinery or dangerous appliances for rest of today  You may not drink alcoholic beverages for 12 hours  Avoid making any critical decisions for 24 hours    DIET:  You may resume your normal diet, but some patients find that heavy or large meals may lead to indigestion or vomiting. I suggest a light meal as first food intake. MEDICATIONS:  The use of some over-the-counter pain medication may lead to bleeding after biopsies or other procedures you may have had done. Tylenol (also called acetaminophen) is safe to take and will not lead to bleeding. Based on the procedure you had today you may not safely take aspirin or aspirin-like products for the next ten (10) days. ACTIVITY:  You may resume your normal household activities, but it is recommended that you spend the remainder of the day resting -  avoid any strenuous activity. CALL DR. Gene Avelar' OFFICE IF:  Increasing pain, nausea, vomiting  Abdominal distension (swelling)  Significant new or increased bleeding (oral or rectal)  Fever/Chills  Chest pain/shortness of breath                   Additional instructions:   Great news, no significant findings today. In fact, the upper GI tract appears entirely healthy. Given your symptoms I elected to take samples of the surface of the esophagus to make sure that there are no microscopic abnormalities and we dilated/stretched the esophagus to see if that improves your swallowing. I'll contact you with the biopsy results in 10-14 days. It was an honor to be your doctor today. Please let me or my office staff know if you have any feedback about today's procedure.     Kailyn Koroma MD

## 2022-11-16 NOTE — PROGRESS NOTES
Endoscopy discharge instructions have been reviewed and given to patient. The patient verbalized understanding and acceptance of instructions. Dr. [facility] discussed with patient procedure findings and next steps.

## 2022-11-16 NOTE — ANESTHESIA PREPROCEDURE EVALUATION
Relevant Problems   NEUROLOGY   (+) Moderate episode of recurrent major depressive disorder (HCC)      GASTROINTESTINAL   (+) Gastroesophageal reflux disease      ENDOCRINE   (+) Morbid obesity due to excess calories (HCC)       Anesthetic History   No history of anesthetic complications            Review of Systems / Medical History  Patient summary reviewed and pertinent labs reviewed    Pulmonary  Within defined limits        Undiagnosed apnea         Neuro/Psych   Within defined limits           Cardiovascular    Hypertension                Comments: Undiagnosed HTN   GI/Hepatic/Renal     GERD           Endo/Other        Morbid obesity     Other Findings              Physical Exam    Airway  Mallampati: III    Neck ROM: normal range of motion   Mouth opening: Normal     Cardiovascular    Rhythm: regular  Rate: normal         Dental  No notable dental hx       Pulmonary  Breath sounds clear to auscultation               Abdominal  GI exam deferred       Other Findings            Anesthetic Plan    ASA: 3  Anesthesia type: MAC          Induction: Intravenous  Anesthetic plan and risks discussed with: Patient

## 2022-11-16 NOTE — PROGRESS NOTES
China Gilderrick  1980  [id number]    Situation:  Verbal report received from: Lexy Spencer RN  Procedure: Procedure(s):  ESOPHAGOGASTRODUODENOSCOPY (EGD)    Background:    Preoperative diagnosis: ESOPHAGEAL DYSPHAGIA  Postoperative diagnosis: * No post-op diagnosis entered *    :  Dr. Carisa Avila  Assistant(s): Endoscopy Technician-1: Ja Welsh  Endoscopy RN-1: Louis Burton RN    Specimens:   ID Type Source Tests Collected by Time Destination   1 : eg junction biopsy Preservative   Moses Galloway MD 11/16/2022 0901 Pathology   2 : mid esophagus biopsy Preservative Esophagus, Mid  Moses Galloway MD 11/16/2022 0901 Pathology     H. Pylori  no    Assessment:  Intra-procedure medications   Anesthesia gave intra-procedure sedation and medications, see anesthesia flow sheet yes    Intravenous fluids: NS@ KVO     Vital signs stable yes    Abdominal assessment: round and soft yes    Recommendation:  Discharge patient per MD order yes.   Family or Friend friend  Permission to share finding with family or friend yes Daryl Toro  : 5/3/1931 [facility] [place] [id number] [facility], [address] [place] [address],[address], [facility] [address]  [phone]   [phone]         OUTPATIENT SPEECH LANGUAGE PATHOLOGY: Daily Note 3  ICD-10: Treatment Diagnosis: Oropharyngeal Dysphagia R13.12  REFERRING PHYSICIAN: Lindsay Briones MD MD Orders: CVA  PAST MEDICAL HISTORY:   Mr. Matt Broderick is a 80 y.o. male who  has a past medical history of Cerebrovascular accident (CVA) due to embolism of left middle cerebral artery ([facility] [address] COPD; Hypertension; Stroke [facility]); and Subdural hematoma ([facility] [address] (2016). He also  has a past surgical history that includes appendectomy. MEDICAL/REFERRING DIAGNOSIS: CVA (cerebral vascular accident) [facility]) [I63.9]  DATE OF ONSET: 2017   PRIOR LEVEL OF FUNCTION: Required some supervision  PRECAUTIONS/ALLERGIES: Review of patient's allergies indicates no known allergies. ASSESSMENT:  Patient arrived at for Dysphagia therapy. He presents with mild-moderate Dysphagia therapy. Patient completed swallowing exercises. See below for reps. Some exercises patient had some difficulty demonstrating despite visual and verbal cues. Patient will benefit from skilled intervention to address the below impairments. ?????? ? ? This section established at most recent assessment??????????  PROBLEM LIST (Impairments causing functional limitations):  1. Oropharyngeal Dysphagia  GOALS: (Goals have been discussed and agreed upon with patient.)  SHORT-TERM FUNCTIONAL GOALS: Time Frame: 3-6 months  Patient will tolerate trials of thin liquids without overt clinical s/sx of aspiration on 10/10 trials. Patient will complete laryngeal strengthening exercises at Mod I 90% accuracy  Participate in MBSS. DISCHARGE GOALS: Time Frame: 3-6 months  1. Patient will tolerate least restrictive diet without overt clinical s/sx of aspiration for a safe and adequate swallow function without respiratory compromise at 100%. REHABILITATION POTENTIAL FOR STATED GOALS: FairPLAN OF CARE:  Patient will benefit from skilled intervention to address the following impairments. RECOMMENDATIONS AND PLANNED INTERVENTIONS (Benefits and precautions of therapy have been discussed with the patient.):  · continue prescribed diet  · Liquids:  regular thin  MEDICATIONS:  · With liquid  COMPENSATORY STRATEGIES/MODIFICATIONS INCLUDING:  · Small sips and bites  OTHER RECOMMENDATIONS (including follow up treatment recommendations):   · Laryngeal exercises  · Patient education  RECOMMENDED DIET MODIFICATIONS DISCUSSED WITH:  · Family  · Patient  TREATMENT PLAN EFFECTIVE DATES: 5/3/17 TO 8/7/17  FREQUENCY/DURATION: Continue to follow patient 1 time a week for 12 weeks to address above goals. Regarding Wenda Dux therapy, I certify that the treatment plan above will be carried out by a therapist or under their direction. Thank you for this referral,  ROSALBA Reich Ed CCC-SLP                  Referring Physician Signature: Kd Fernandez MD    Date      SUBJECTIVE:  Cooperative, attended evaluation with supportive wife. Present Symptoms: Dysphagia      Current Medications:   Current Outpatient Prescriptions on File Prior to Encounter   Medication Sig Dispense Refill    atorvastatin (LIPITOR) 10 mg tablet Take  by mouth daily.  clopidogrel (PLAVIX) 75 mg tab Take  by mouth.  simvastatin (ZOCOR) 20 mg tablet Take 1 Tab by mouth nightly. 30 Tab 5    apixaban (ELIQUIS) 5 mg tablet Take 1 Tab by mouth two (2) times a day. Indications: prevent stroke in atrial flutter 60 Tab 5    levETIRAcetam (KEPPRA) 500 mg tablet Take 2 Tabs by mouth two (2) times a day. 120 Tab 4    losartan (COZAAR) 50 mg tablet Take 50 mg by mouth daily. Indications: HYPERTENSION WITH LEFT VENTRICULAR HYPERTROPHY       No current facility-administered medications on file prior to encounter.          Date Last Reviewed: 5/19/17  Current Dietary Status: Regular/Thin      History of reflux:  NO    Reflux medication:N/A  Social History/Home Situation: Lives with wife. Work/Activity History: Unknown    OBJECTIVE:  Objective Measure: Tool Used: National Outcomes Measurement System: Functional Communication Measures: SWALLOWING  Score:  Initial: 4 Most Recent: X (Date: -- )   Interpretation of Tool: This measure describes the change in functional communication status subsequent to speech-language pathology treatment of patients with dysphagia.  o Level 1:  Individual is not able to swallow anything safely by mouth. All nutrition and hydration is received through non-oral means (e.g., nasogastric tube, PEG). o Level 2: Individual is not able to swallow safely by mouth for nutrition and hydration, but may take some consistency with consistent maximal cues in therapy only. Alternative method of feeding required. o Level 3:  Alternative method of feeding required as individual takes less than 50% of nutrition and hydration by mouth, and/or swallowing is safe with consistent use of moderate cues to use compensatory strategies and/or requires maximum diet restriction. o Level 4:  Swallowing is safe, but usually requires moderate cues to use compensatory strategies, and/or the individual has moderate diet restrictions and/or still requires tube feeding and/or oral supplements. o Level 5:  Swallowing is safe with minimal diet restriction and/or occasionally requires minimal cueing to use compensatory strategies. The individual may occasionally self-cue. All nutrition and hydration needs are met by mouth at mealtime. o Level 6:  Swallowing is safe, and the individual eats and drinks independently and may rarely require minimal cueing. The individual usually self-cues when difficulty occurs. May need to avoid specific food items (e.g., popcorn and nuts), or require additional time (due to dysphagia). o Level 7:   The individuals ability to eat independently is not limited by swallow function. Swallowing would be safe and efficient for all consistencies. Compensatory strategies are effectively used when needed. Score Level 7 Level 6 Level 5 Level 4 Level 3 Level 2 Level 1   Modifier CH CI CJ CK CL CM CN   ? Swallowing:     - CURRENT STATUS: CK - 40%-59% impaired, limited or restricted    - GOAL STATUS:  CI - 1%-19% impaired, limited or restricted    - D/C STATUS:  ---------------To be determined---------------    Respiratory Status:       Room Air  CXR Results:N/A  MRI/CT Results:Unknown  Oral Motor Structure/Speech:       Cognitive and Communication Status:                      BEDSIDE SWALLOW EVALUATION  Oral Assessment:     P.O. Trials: The patient was given teaspoon to tablespoon   amounts of the following:           ORAL PHASE:                   PHARYNGEAL PHASE:                            OTHER OBSERVATIONS:  Rate/bite size: Questionable   Endurance:  Questionable   Coments:      TREATMENT:    (In addition to Assessment/Re-Assessment sessions the following treatments were rendered)  Dysphagia Activities: Activities/Procedures listed utilized to improve progress in swallow strength, swallow timeliness, swallow function and swallow safety. Required moderate cueing to improve swallow safety and work toward diet advancement. LARYNGEAL / PHARYNGEAL EXERCISES:           Effortful Swallow: Yes  Reps : 10  Sets : 1     Reps : 20  Sets : 1                       Maia: Yes        Mendelsohn Maneuver: Yes     Sets : 1 (patient unable to demonstrate due to poor comprehension of exercise)                                     Supraglottic Swallow: Yes  Reps : 5  Sets : 1                       __________________________________________________________________________________________________  Treatment Assessment:  Dysphagia evaluation.   Progression/Medical Necessity:   · Skilled intervention continues to be required due to patient still consuming a modified diet.  Compliance with Program/Exercises: Will assess as treatment progresses. Reason for Continuation of Services/Other Comments:  · Patient continues to require skilled intervention due to medical complications. Recommendations/Intent for next treatment session: \"Treatment next visit will focus on laryngeal exercises\". Total Treatment Duration:  Time In: 1220  Time Out: Debbie [address] [place] [address]. Luca Fraser

## 2022-11-16 NOTE — PERIOP NOTES
CRE balloon dilatation of the esophagus   18 mm Balloon inflated to 4 ATMs and held for 2 seconds. 19 mm Balloon inflated to 4.5 ATMs and held for 2 seconds. 20 mm Balloon inflated to 6 ATMs and held for 30 seconds. No subcutaneous crepitus of the chest or cervical region was noted post dilatation.

## 2022-11-16 NOTE — PROCEDURES
[address] [place], [place]  [phone]      2022    Esophagogastroduodenoscopy (EGD) Procedure Note  Brie Morales  : 1980  [address] [place] [address] Medical Record Number: [id number]      Indications:    Dysphagia  Referring Physician:  Rafael Cantor MD  Anesthesia/Sedation:  Conscious sedation/deep sedation/monitored anesthesia -- see notes. Endoscopist:  Dr. Tabby Blake  Complications:  None  Estimated Blood Loss:  None    Permit:  The indications, risks, benefits and alternatives were reviewed with the patient or their decision maker who was provided an opportunity to ask questions and all questions were answered. The specific risks of esophagogastroduodenoscopy with conscious sedation were reviewed, including but not limited to anesthetic complication, bleeding, adverse drug reaction, missed lesion, infection, IV site reactions, and intestinal perforation which would lead to the need for surgical repair. Alternatives to EGD including radiographic imaging, observation without testing, or laboratory testing were reviewed as well as the limitations of those alternatives discussed. After considering the options and having all their questions answered, the patient or their decision maker provided both verbal and written consent to proceed. Procedure in Detail:  After obtaining informed consent, positioning of the patient in the left lateral decubitus position, and conduction of a pre-procedure pause or \"time out\" the endoscope was introduced into the mouth and advanced to the duodenum. A careful inspection was made, and findings or interventions are described below.     Findings:   Esophagus:normal  Stomach: normal   Duodenum/jejunum: normal    Given her symptoms of solid food dysphagia we took biopsies using cold forceps of the EG junction and mid esophagus to exclude any microscopic abnormalities and then empirically dilated the esophagus with 20mm balloon. Specimens: See above    Impression: Normal EGD. Recommendations:  -Await pathology. Thank you for entrusting me with this patient's care. Please do not hesitate to contact me with any questions or if I can be of assistance with any of your other patients' GI needs. Signed By: Bia Lazcano MD                        November 16, 2022     Surgical assistant none. Implants none unless specified.

## 2022-11-16 NOTE — ANESTHESIA POSTPROCEDURE EVALUATION
Procedure(s):  ESOPHAGOGASTRODUODENOSCOPY (EGD)  ESOPHAGOGASTRODUODENAL (EGD) BIOPSY  ESOPHAGEAL DILATION. MAC    Anesthesia Post Evaluation      Multimodal analgesia: multimodal analgesia not used between 6 hours prior to anesthesia start to PACU discharge  Patient location during evaluation: PACU  Patient participation: complete - patient participated  Level of consciousness: awake  Pain management: adequate  Airway patency: patent  Anesthetic complications: no  Cardiovascular status: acceptable, blood pressure returned to baseline and hemodynamically stable  Respiratory status: acceptable  Hydration status: acceptable  Post anesthesia nausea and vomiting:  controlled  Final Post Anesthesia Temperature Assessment:  Normothermia (36.0-37.5 degrees C)      INITIAL Post-op Vital signs:   Vitals Value Taken Time   /71 11/16/22 0932   Temp 36.8 °C (98.3 °F) 11/16/22 0910   Pulse 95 11/16/22 0932   Resp 13 11/16/22 0932   SpO2 95 % 11/16/22 0932   Vitals shown include unvalidated device data.

## 2022-11-16 NOTE — PERIOP NOTES
0835  Timeout performed. Anesthesia staff at patient's bedside administering anesthesia and monitoring patients vital signs throughout procedure. See anesthesia note. Post procedure, report received from CRNA, Darryle Sierras was pre-cleaned at bedside immediately following procedure by endo Nagi rodrigues. [id number]  Patient tolerated procedure. Abdomen soft and patient arousable and voices no complaints. Patient transported to endoscopy recovery area. Report given to post procedure RNLexie.

## 2022-11-20 ENCOUNTER — PATIENT MESSAGE (OUTPATIENT)
Dept: FAMILY MEDICINE CLINIC | Age: 42
End: 2022-11-20

## 2022-11-20 DIAGNOSIS — R03.0 ELEVATED BP WITHOUT DIAGNOSIS OF HYPERTENSION: Primary | ICD-10-CM

## 2022-11-21 ENCOUNTER — APPOINTMENT (OUTPATIENT)
Dept: NUTRITION | Age: 42
End: 2022-11-21

## 2022-11-25 RX ORDER — LISINOPRIL 10 MG/1
10 TABLET ORAL DAILY
Qty: 30 TABLET | Refills: 3 | Status: SHIPPED | OUTPATIENT
Start: 2022-11-25

## 2022-12-12 ENCOUNTER — APPOINTMENT (OUTPATIENT)
Dept: NUTRITION | Age: 42
End: 2022-12-12
Payer: COMMERCIAL

## 2022-12-14 ENCOUNTER — HOSPITAL ENCOUNTER (OUTPATIENT)
Dept: NUTRITION | Age: 42
Discharge: HOME | End: 2022-12-14
Payer: COMMERCIAL

## 2022-12-14 PROCEDURE — 97803 MED NUTRITION INDIV SUBSEQ: CPT | Performed by: DIETITIAN, REGISTERED

## 2022-12-14 NOTE — PROGRESS NOTES
NUTRITION - FOLLOW-UP TREATMENT NOTE  Patient Name: Tripp Saez         Date: 2022  : 1980    YES Patient  Verified  Diagnosis: prediabets, manjinder cholesterol,    E66.01 (ICD-10-CM) - Morbid obesity due to excess calories ([facility] [address]      In time:   4:00pm       Out time:   4:38pm   Total Treatment Time (min):  38     SUBJECTIVE/ASSESSMENT  Current Wt: 309.8 Previous Wt: 300.0 Wt Change: +9.8     Initial Wt: 296.2 Total Wt change: +13.8 Height: 60     Changes in medication or medical history? Any new allergies, surgeries or procedures? Yes    If yes, update Summary List   Lab Results   Component Value Date/Time    Hemoglobin A1c 5.8 (H) 2022 12:00 AM     Lab Results   Component Value Date/Time    Cholesterol, total 204 (H) 2022 12:00 AM    HDL Cholesterol 54 2022 12:00 AM    LDL, calculated 132 (H) 2022 12:00 AM    VLDL, calculated 18 2022 12:00 AM    Triglyceride 103 2022 12:00 AM     Lab Results   Component Value Date/Time    TSH 1.180 2022 09:18 AM     BP Readings from Last 3 Encounters:   22 (!) 154/70   22 (!) 143/90   22 (!) 121/91     Current Outpatient Medications   Medication Instructions    buPROPion XL (WELLBUTRIN XL) 150 mg, Oral, DAILY    lisinopriL (PRINIVIL, ZESTRIL) 10 mg, Oral, DAILY    pantoprazole (PROTONIX) 20 mg, Oral, DAILY    phentermine 30 mg, Oral, 7AM     Endoscopy since last visit. Throat stretched. Nutrition Diagnosis        Diagnosis Status: New: Food and nutrition related knowledge deficit R/T lack of prior education for  GERD triggers and behavioral management AEB pt report of burning sensation when consuming tomatoes and citrus items, reports choking on water as well, history of GERD diagnosis.    [x]  Improved []  No Change    []  Declined   []  Discontinued       Food and nutrition related knowledge deficit R/T lack of prior education for healthy weight loss nutrition AEB request for counseling [x]  Improved []  No Change    []  Declined   []  Discontinued       Elevated nutrition related lab value (LDL Cholesterol) R/T history of excessive saturated fat intake, inadequate fiber intake AEB LDL of 132mg/dl, diet history of eating fried foods, processed foods, minimal fruits and vegetables. [x]  Improved []  No Change    []  Declined   []  Discontinued        Nutrition Monitoring and Evaluation: Not currently taking phenterimine. Restarting in January. Restarting walking routine. Walking about 45min 2 mph. Less pain with going forward. Doing some chair exercises and bike for 30 min    Using [employer] to log meals. Chicken and fish (maji maji) 1 cup protein 2 times per day/  + rice (1/3-1/2 cup) + green beans   Overnight oats for breakfast (Presybeterian oats 3/4 cup) + premier protein shake (1/3) + almond milk unsweetened + fruit (1/3 cup)  Snacks: rice cakes. Drive to eat bigger portions often happens at night. Tries to go to sleep insetad. BED Screener:  During the last 3 months, did you have any episodes of  excessive overeating (i.e., eating signi cantly more than  what most people would eat in a similar period of time)? - yes  Do you feel distressed about your episodes  of excessive overeating? - yes  In the past 3 months. .. During your episodes of excessive  overeating, how often did you feel like  you had no control over your eating (e.g.,  not being able to stop eating, feel  compelled to eat, or going back and  forth for more food)?  - sometimes  During your episodes of excessive  overeating, how often did you continue  eating even though you were not hungry? -often  During your episodes of excessive  overeating, how often were you  embarrassed by how much you ate? - always  During your episodes of excessive  overeating, how often did you feel  disgusted with yourself or guilty afterward? - always  During the last 3 months, how often  did you make yourself vomit as a means  to control your weight or shape? - never. Episodes of excessive over eating happen about 1-3 times per week over the past 3 months. Suspected triggers: unknown        Previous goals:  Reports generally met. Some meals 60g. Higher with episodes of excessive over eating. -continue improved CHO consistency with balanced plate (64-53L total carb per meal as guideance)  -improved. improve physical activity with goal to be active or 30-60min daily  Met. -improve water intake with sipping over the day. Met. -GERD: reduce intake of high fat foods including fried, nuts, high fat meats. Reduce intake of mints and pepper added to foods. Reduce intake of tomatoes and citrus fruits. Keeping on phone. Not shown. -keep food log and bring to next session (3 days per week)     Nutrition Prescription and  Intervention Goal setting  Reviewed carb counting, label reading  Exercise goals around increasing towards 30-60min per day. Adjusted for hip pain. Educated on GERD Nutrition. Request further evaluation from doctor for the complete DSM-5® diagnostic criteria for B.E.D. Patient Education:  []  Review current plan with patient   []  Other:    Handouts/  Information Provided: []  Carbohydrates  []  Protein  []  Fiber  []  Serving Sizes  []  Fluids  []  General guidelines []  Diabetes  []  Cholesterol  []  Sodium  []  SBGM  []  Food Journals  []  Others:      Patient Goals -continue improved CHO consistency with balanced plate (70-81Q total carb per meal as guideance)- 1 cup max per meal. Use measurng cups. -improve physical activity with goal to be active or 30-60min daily  -eat breakfast 2-3 times er week.   -keep food log and bring to next session (3 days per week)  - if stressed go outside or walk.       PLAN  [x]  Continue on current plan []  Follow-up PRN   []  Discharge due to :    [x]  Next appt: 1 month     Dietitian: Maikel Delaney MS, RD, CSSD    Date: 12/14/2022 Time: 5 PM

## 2023-01-12 ENCOUNTER — APPOINTMENT (OUTPATIENT)
Dept: NUTRITION | Age: 43
End: 2023-01-12
Payer: COMMERCIAL

## 2023-01-25 ENCOUNTER — OFFICE VISIT (OUTPATIENT)
Dept: SURGERY | Age: 43
End: 2023-01-25
Payer: COMMERCIAL

## 2023-01-25 VITALS
HEIGHT: 60 IN | HEART RATE: 97 BPM | WEIGHT: 293 LBS | BODY MASS INDEX: 57.52 KG/M2 | DIASTOLIC BLOOD PRESSURE: 89 MMHG | RESPIRATION RATE: 20 BRPM | TEMPERATURE: 98.5 F | OXYGEN SATURATION: 18 % | SYSTOLIC BLOOD PRESSURE: 130 MMHG

## 2023-01-25 DIAGNOSIS — R63.2 BINGE EATING: ICD-10-CM

## 2023-01-25 DIAGNOSIS — E66.01 MORBID OBESITY DUE TO EXCESS CALORIES (HCC): Primary | ICD-10-CM

## 2023-01-25 DIAGNOSIS — R73.03 PREDIABETES: ICD-10-CM

## 2023-01-25 DIAGNOSIS — E78.00 PURE HYPERCHOLESTEROLEMIA: ICD-10-CM

## 2023-01-25 PROCEDURE — 99214 OFFICE O/P EST MOD 30 MIN: CPT | Performed by: INTERNAL MEDICINE

## 2023-01-25 RX ORDER — PHENTERMINE HYDROCHLORIDE 37.5 MG/1
37.5 TABLET ORAL
Qty: 30 TABLET | Refills: 1 | Status: SHIPPED | OUTPATIENT
Start: 2023-01-25

## 2023-01-25 NOTE — PROGRESS NOTES
Identified pt with two pt identifiers (name and ). Reviewed chart in preparation for visit and have obtained necessary documentation. Marycarmen La is a 43 y.o. female  Chief Complaint   Patient presents with    Weight Management     EP/Program Restart/Serjio     BMI 59.9    Visit Vitals  /89 (BP 1 Location: Left upper arm, BP Patient Position: Sitting, BP Cuff Size: Thigh)   Pulse 97   Temp 98.5 °F (36.9 °C) (Oral)   Resp 20   Ht 5' (1.524 m)   Wt 307 lb 6.4 oz (139.4 kg)   SpO2 (!) 18%   BMI 60.03 kg/m²       1. Have you been to the ER, urgent care clinic since your last visit? Hospitalized since your last visit? No    2. Have you seen or consulted any other health care providers outside of the [address] [place] [address] since your last visit? Include any pap smears or colon screening.  No

## 2023-01-25 NOTE — PROGRESS NOTES
Entering order for [facility] on behalf of mishel burton NP.  There were technical issues with the original order  DB

## 2023-01-25 NOTE — PROGRESS NOTES
HISTORY OF PRESENT ILLNESS  Sondra Nageotte is a 43 y.o. female. Patient was seen for a follow up. Has not been seen a several months. Reports that she did fall off the plan and it was due to her choices. Wants to consider getting back on medications to help. Also wants to explore the more detailed reason why she picks her foods and choices. Was seen by a dietician earlier last years and was told her had binging like tendencies. Reports that her partner is on a health journey as well. Wants to do this together. Has gained about 10 lbs back since the last visit. Vitals today are stable   Visit Vitals  /89 (BP 1 Location: Left upper arm, BP Patient Position: Sitting, BP Cuff Size: Thigh)   Pulse 97   Temp 98.5 °F (36.9 °C) (Oral)   Resp 20   Ht 5' (1.524 m)   Wt 307 lb 6.4 oz (139.4 kg)   SpO2 (!) 18%   BMI 60.03 kg/m²     Past Medical History:   Diagnosis Date    Depression 05/11/2022    Difficulty swallowing 05/11/2022    Elevated blood pressure reading     GERD (gastroesophageal reflux disease) 05/11/2022    High cholesterol     Irregular periods/menstrual cycles     Kidney stone 2016    Obesity 05/11/2022     Past Surgical History:   Procedure Laterality Date    HX UROLOGICAL  2016    kidney stone removed    MN CYSTO W/INSERT URETERAL STENT  6.23.16    Dr. Krys Gonzalez ([facility])     Family History   Problem Relation Age of Onset    Cancer Mother         leukemia    Leukemia Mother     High Cholesterol Father     Hypertension Father     High Cholesterol Brother     Lung Cancer Maternal Uncle     Colon Cancer Maternal Grandmother 80    Alzheimer's Disease Maternal Grandfather     COPD Paternal Grandfather      Outpatient Encounter Medications as of 1/25/2023   Medication Sig Dispense Refill    phentermine (ADIPEX-P) 37.5 mg tablet Take 1 Tablet by mouth every morning. Max Daily Amount: 37.5 mg. 30 Tablet 1    lisinopriL (PRINIVIL, ZESTRIL) 10 mg tablet Take 1 Tablet by mouth daily.  30 Tablet 3 buPROPion XL (WELLBUTRIN XL) 150 mg tablet Take 1 Tablet by mouth daily. 90 Tablet 1    pantoprazole (PROTONIX) 20 mg tablet Take 1 Tablet by mouth daily. 90 Tablet 1    [DISCONTINUED] phentermine 30 mg capsule Take 1 Capsule by mouth every morning. Max Daily Amount: 30 mg. (Patient not taking: No sig reported) 30 Capsule 0     No facility-administered encounter medications on file as of 1/25/2023. HPI    Review of Systems   Constitutional: Negative. Respiratory: Negative. Cardiovascular: Negative. Gastrointestinal: Negative. Musculoskeletal: Negative. Neurological: Negative. Psychiatric/Behavioral:  Positive for depression. Physical Exam  Vitals and nursing note reviewed. Constitutional:       Appearance: She is obese. Cardiovascular:      Rate and Rhythm: Normal rate and regular rhythm. Pulmonary:      Effort: Pulmonary effort is normal.      Breath sounds: Normal breath sounds. Abdominal:      Palpations: Abdomen is soft. Skin:     General: Skin is warm. Neurological:      Mental Status: She is alert and oriented to person, place, and time. Psychiatric:         Behavior: Behavior normal.       ASSESSMENT and PLAN  Diagnoses and all orders for this visit:    1. Morbid obesity due to excess calories ([facility])  -     phentermine (ADIPEX-P) 37.5 mg tablet; Take 1 Tablet by mouth every morning. Max Daily Amount: 37.5 mg.    2. Pure hypercholesterolemia    3.  Prediabetes      Follow-up and Dispositions    Return in about 1 month (around 2/25/2023), or if symptoms worsen or fail to improve.       lab results and schedule of future lab studies reviewed with patient  reviewed diet, exercise and weight control  reviewed medications and side effects in detail

## 2023-02-02 ENCOUNTER — HOSPITAL ENCOUNTER (OUTPATIENT)
Dept: NUTRITION | Age: 43
Discharge: HOME | End: 2023-02-02
Payer: COMMERCIAL

## 2023-02-02 PROCEDURE — 97803 MED NUTRITION INDIV SUBSEQ: CPT | Performed by: DIETITIAN, REGISTERED

## 2023-02-02 NOTE — PROGRESS NOTES
NUTRITION - FOLLOW-UP TREATMENT NOTE  Patient Name: Edvin Quintero         Date: 2023  : 1980    YES Patient  Verified  Diagnosis: prediabets, manjinder cholesterol,    E66.01 (ICD-10-CM) - Morbid obesity due to excess calories ([address]      In time:   4:30pm       Out time:   5:00pm   Total Treatment Time (min):  30     SUBJECTIVE/ASSESSMENT  Current Wt: 306.2 Previous Wt: 309.8 Wt Change: -3.6     Initial Wt: 296.2 Total Wt change: +10 Height: 60     Changes in medication or medical history? Any new allergies, surgeries or procedures? Yes    If yes, update Summary List   Lab Results   Component Value Date/Time    Hemoglobin A1c 5.8 (H) 2022 12:00 AM     Lab Results   Component Value Date/Time    Cholesterol, total 204 (H) 2022 12:00 AM    HDL Cholesterol 54 2022 12:00 AM    LDL, calculated 132 (H) 2022 12:00 AM    VLDL, calculated 18 2022 12:00 AM    Triglyceride 103 2022 12:00 AM     Lab Results   Component Value Date/Time    TSH 1.180 2022 09:18 AM     BP Readings from Last 3 Encounters:   23 130/89   22 (!) 154/70   22 (!) 143/90     Current Outpatient Medications   Medication Instructions    buPROPion XL (WELLBUTRIN XL) 150 mg, Oral, DAILY    lisinopriL (PRINIVIL, ZESTRIL) 10 mg, Oral, DAILY    pantoprazole (PROTONIX) 20 mg, Oral, DAILY    phentermine (ADIPEX-P) 37.5 mg, Oral, 7AM     Endoscopy since last visit. Throat stretched. Nutrition Diagnosis        Diagnosis Status: Food and nutrition related knowledge deficit R/T lack of prior education for  GERD triggers and behavioral management AEB pt report of burning sensation when consuming tomatoes and citrus items, reports choking on water as well, history of GERD diagnosis.    [x]  Improved []  No Change    []  Declined   []  Discontinued       Food and nutrition related knowledge deficit R/T lack of prior education for healthy weight loss nutrition AEB request for counseling [x]  Improved []  No Change    []  Declined   []  Discontinued       Elevated nutrition related lab value (LDL Cholesterol) R/T history of excessive saturated fat intake, inadequate fiber intake AEB LDL of 132mg/dl, diet history of eating fried foods, processed foods, minimal fruits and vegetables. [x]  Improved []  No Change    []  Declined   []  Discontinued        Nutrition Monitoring and Evaluation: Boyfriend has new health condition and has been making changes that more match her dietary choices. Less junk. More fruits and veggies. Eating more fish. Tilapia. Baked   Usign 647 bread. Usually 2 slices 1 times per day. Episodes of over eating = few days of having a piece of cake. 1 individual slice from store. Trying not to add salt to foods. B- fried eggs with pepper with 1 slice wheat toast, 1 cup of fruit (8oz), water  S-   L- salad bar at work. salad with mixed greens with chicken and cheese (7-8 cubes), balsamic vinaigrette (1 packet), boiled eggs, carrots, onion, sunflower seeds (1 tbsp)   S- cashews (raw nuts from  laura) - 1/3 cup  D- fish (2x 3oz ) + ice tenzin with mozzarella cheese (1/4 cup), cucumber, light ranch (1/3 cup)  S- cake  (food lion = 430 kcal) - typically 1 time per week. Or 2. Trigger = missing something sweet. Trying to use flavored rice cakes for sweet treat instead. Drinks: water,     No sodas in past 2 months. Pt plans to start counseling for depression and binge type patterns. Previous goals:  Met. Reports limiting to 1 cup starch. -continue improved CHO consistency with balanced plate (70-89H total carb per meal as guideance)- 1 cup max per meal. Use measurng cups. Walking 3000 steps per day. Walking 1 mile for exercise 2-3 times per week. -improve physical activity with goal to be active or 30-60min daily  Met. -eat breakfast 2-3 times er week. Not met.  Notes wanting to make better choices when keeping log. -keep food log and bring to next session (3 days per week)  Improved. - if stressed go outside or walk. Nutrition Prescription and  Intervention Goal setting  Reviewed carb counting, label reading  Review of sodium and provided product ideas for lower sodium and carbohydrate. Exercise goals to increase steps to 5000 per day. Patient Education:  [x]  Review current plan with patient   []  Other:    Handouts/  Information Provided: []  Carbohydrates  []  Protein  []  Fiber  []  Serving Sizes  []  Fluids  []  General guidelines []  Diabetes  []  Cholesterol  []  Sodium  []  SBGM  []  Food Journals  []  Others:      Patient Goals -continue improved CHO consistency with balanced plate (57-09O total carb per meal as guideance)- 1 cup max per meal. Use measurng cups. -improve physical activity with goal to increase steps towards 5000 steps per dya.   -eat breakfast 2-3 times er week.   -keep food log and bring to next session (3 days per week)  - if stressed go outside or walk.       PLAN  [x]  Continue on current plan []  Follow-up PRN   []  Discharge due to :    [x]  Next appt: 1 month     Dietitian: Julia Ferrell MS, RD, CSSD    Date: 2/2/2023 Time: 5 PM

## 2023-03-09 ENCOUNTER — HOSPITAL ENCOUNTER (OUTPATIENT)
Dept: NUTRITION | Age: 43
Discharge: HOME | End: 2023-03-09
Payer: COMMERCIAL

## 2023-03-09 PROCEDURE — 97803 MED NUTRITION INDIV SUBSEQ: CPT | Performed by: DIETITIAN, REGISTERED

## 2023-03-09 NOTE — PROGRESS NOTES
NUTRITION - FOLLOW-UP TREATMENT NOTE  Patient Name: Rhianna Chavarria         Date: 3/9/2023  : 1980    YES Patient  Verified  Diagnosis: prediabets, manjinder cholesterol,    E66.01 (ICD-10-CM) - Morbid obesity due to excess calories ([address]      In time:   4:30pm       Out time:   5:00pm   Total Treatment Time (min):  30     SUBJECTIVE/ASSESSMENT  Current Wt: 304.6 Previous Wt: 306.2 Wt Change: -1.6     Initial Wt: 296.2 Total Wt change: +11.6 Height: 60     Changes in medication or medical history? Any new allergies, surgeries or procedures? Yes    If yes, update Summary List   Lab Results   Component Value Date/Time    Hemoglobin A1c 5.8 (H) 2022 12:00 AM     Lab Results   Component Value Date/Time    Cholesterol, total 204 (H) 2022 12:00 AM    HDL Cholesterol 54 2022 12:00 AM    LDL, calculated 132 (H) 2022 12:00 AM    VLDL, calculated 18 2022 12:00 AM    Triglyceride 103 2022 12:00 AM     Lab Results   Component Value Date/Time    TSH 1.180 2022 09:18 AM     BP Readings from Last 3 Encounters:   23 130/89   22 (!) 154/70   22 (!) 143/90     Current Outpatient Medications   Medication Instructions    buPROPion XL (WELLBUTRIN XL) 150 mg, Oral, DAILY    lisinopriL (PRINIVIL, ZESTRIL) 10 mg, Oral, DAILY    pantoprazole (PROTONIX) 20 mg, Oral, DAILY    phentermine (ADIPEX-P) 37.5 mg, Oral, 7AM     Endoscopy since last visit. Throat stretched. Nutrition Diagnosis        Diagnosis Status: Food and nutrition related knowledge deficit R/T lack of prior education for  GERD triggers and behavioral management AEB pt report of burning sensation when consuming tomatoes and citrus items, reports choking on water as well, history of GERD diagnosis.    [x]  Improved []  No Change    []  Declined   []  Discontinued       Food and nutrition related knowledge deficit R/T lack of prior education for healthy weight loss nutrition AEB request for counseling [x]  Improved []  No Change    []  Declined   []  Discontinued       Elevated nutrition related lab value (LDL Cholesterol) R/T history of excessive saturated fat intake, inadequate fiber intake AEB LDL of 132mg/dl, diet history of eating fried foods, processed foods, minimal fruits and vegetables. [x]  Improved []  No Change    []  Declined   []  Discontinued        Nutrition Monitoring and Evaluation: Walking 3 times per week (15 min at work) and 3-4 times per week. After work. Having hip pain especially when using stairs. B- Oatmeal for breakfast with almond milk, 1/2 tsp of honey, banana and strawberries. Sometimes with toast (sugar free mutlit grain bread)  L- spinach greens with sunflower seeds (1bsp), walnuts (2tbsp ), jared cheese (1/4 cup), sometimes chicken or small shrimp, raspberry light vinegarette (1/4 cup)  Water. S- cashews (1/8th cup)   D- fish (tilapia baked) + corn (no salt added corn - 1/2 cup), brown rice (1/3 cup with spray butter) sometimes with grapes (1/2 cup)  No snacks after. Trying to find other things to do instead of snacking. 2-3 bottles of water to avoid ice cream.     1 binge type episode in past month. Previous goals:  Improved. -continue improved CHO consistency with balanced plate (95-82A total carb per meal as guideance)- 1 cup max per meal. Use measurng cups. Improved. -improve physical activity with goal to increase steps towards 5000 steps per dya. Improved. -eat breakfast 2-3 times er week. Not met. -keep food log and bring to next session (3 days per week)  Improved. - if stressed go outside or walk. Nutrition Prescription and  Intervention Goal setting  Reviewed carb counting, label reading  Review of sodium and provided product ideas for lower sodium and carbohydrate. Exercise goals to increase steps to 5000 per day.       Patient Education:  [x]  Review current plan with patient   []  Other:    Handouts/  Information Provided: [] Carbohydrates  []  Protein  []  Fiber  []  Serving Sizes  []  Fluids  []  General guidelines []  Diabetes  []  Cholesterol  []  Sodium  []  SBGM  []  Food Journals  []  Others:      Patient Goals -continue improved CHO consistency with balanced plate (92-97W total carb per meal as guideance)- 1 cup max per meal. Use measurng cups.    -improve physical activity with goal to increase steps towards 5000 steps per dya.   -eat breakfast 2-3 times er week.   -keep food log and bring to next session (3 days per week)  - if stressed go outside or walk.   -call therapist for additional support     PLAN  [x]  Continue on current plan []  Follow-up PRN   []  Discharge due to :    [x]  Next appt: 1 month     Dietitian: Toshia Godfrey MS, RD, CSSD    Date: 3/9/2023 Time: 5 PM

## 2023-04-06 ENCOUNTER — APPOINTMENT (OUTPATIENT)
Dept: NUTRITION | Age: 43
End: 2023-04-06

## 2023-05-02 ENCOUNTER — APPOINTMENT (OUTPATIENT)
Dept: NUTRITION | Age: 43
End: 2023-05-02

## 2023-07-23 DIAGNOSIS — K21.9 GASTRO-ESOPHAGEAL REFLUX DISEASE WITHOUT ESOPHAGITIS: ICD-10-CM

## 2023-07-23 DIAGNOSIS — F33.1 MAJOR DEPRESSIVE DISORDER, RECURRENT, MODERATE (HCC): ICD-10-CM

## 2023-07-24 RX ORDER — PANTOPRAZOLE SODIUM 20 MG/1
TABLET, DELAYED RELEASE ORAL
Qty: 30 TABLET | Refills: 5 | Status: SHIPPED | OUTPATIENT
Start: 2023-07-24

## 2023-07-24 RX ORDER — BUPROPION HYDROCHLORIDE 150 MG/1
150 TABLET ORAL EVERY MORNING
Qty: 30 TABLET | Refills: 0 | Status: SHIPPED | OUTPATIENT
Start: 2023-07-24

## 2024-12-20 ENCOUNTER — OFFICE VISIT (OUTPATIENT)
Facility: CLINIC | Age: 44
End: 2024-12-20
Payer: MEDICARE

## 2024-12-20 VITALS
HEIGHT: 60 IN | SYSTOLIC BLOOD PRESSURE: 169 MMHG | WEIGHT: 293 LBS | RESPIRATION RATE: 18 BRPM | DIASTOLIC BLOOD PRESSURE: 93 MMHG | BODY MASS INDEX: 57.52 KG/M2 | OXYGEN SATURATION: 96 % | HEART RATE: 92 BPM

## 2024-12-20 DIAGNOSIS — K21.9 GASTRO-ESOPHAGEAL REFLUX DISEASE WITHOUT ESOPHAGITIS: ICD-10-CM

## 2024-12-20 DIAGNOSIS — Z12.4 CERVICAL CANCER SCREENING: ICD-10-CM

## 2024-12-20 DIAGNOSIS — I10 PRIMARY HYPERTENSION: ICD-10-CM

## 2024-12-20 DIAGNOSIS — F33.1 MAJOR DEPRESSIVE DISORDER, RECURRENT, MODERATE (HCC): Primary | ICD-10-CM

## 2024-12-20 DIAGNOSIS — E63.9 DIETARY DEFICIENCY: ICD-10-CM

## 2024-12-20 DIAGNOSIS — E78.00 PURE HYPERCHOLESTEROLEMIA: ICD-10-CM

## 2024-12-20 DIAGNOSIS — R73.03 PREDIABETES: ICD-10-CM

## 2024-12-20 PROCEDURE — 99214 OFFICE O/P EST MOD 30 MIN: CPT | Performed by: STUDENT IN AN ORGANIZED HEALTH CARE EDUCATION/TRAINING PROGRAM

## 2024-12-20 PROCEDURE — 3080F DIAST BP >= 90 MM HG: CPT | Performed by: STUDENT IN AN ORGANIZED HEALTH CARE EDUCATION/TRAINING PROGRAM

## 2024-12-20 PROCEDURE — 3077F SYST BP >= 140 MM HG: CPT | Performed by: STUDENT IN AN ORGANIZED HEALTH CARE EDUCATION/TRAINING PROGRAM

## 2024-12-20 RX ORDER — BUPROPION HYDROCHLORIDE 150 MG/1
150 TABLET ORAL EVERY MORNING
Qty: 90 TABLET | Refills: 1 | Status: SHIPPED | OUTPATIENT
Start: 2024-12-20

## 2024-12-20 RX ORDER — PANTOPRAZOLE SODIUM 20 MG/1
20 TABLET, DELAYED RELEASE ORAL DAILY
Qty: 90 TABLET | Refills: 3 | Status: SHIPPED | OUTPATIENT
Start: 2024-12-20

## 2024-12-20 RX ORDER — LISINOPRIL 10 MG/1
10 TABLET ORAL DAILY
Qty: 90 TABLET | Refills: 1 | Status: SHIPPED | OUTPATIENT
Start: 2024-12-20

## 2024-12-20 SDOH — ECONOMIC STABILITY: FOOD INSECURITY: WITHIN THE PAST 12 MONTHS, YOU WORRIED THAT YOUR FOOD WOULD RUN OUT BEFORE YOU GOT MONEY TO BUY MORE.: NEVER TRUE

## 2024-12-20 SDOH — ECONOMIC STABILITY: FOOD INSECURITY: WITHIN THE PAST 12 MONTHS, THE FOOD YOU BOUGHT JUST DIDN'T LAST AND YOU DIDN'T HAVE MONEY TO GET MORE.: NEVER TRUE

## 2024-12-20 SDOH — ECONOMIC STABILITY: INCOME INSECURITY: HOW HARD IS IT FOR YOU TO PAY FOR THE VERY BASICS LIKE FOOD, HOUSING, MEDICAL CARE, AND HEATING?: NOT HARD AT ALL

## 2024-12-20 ASSESSMENT — PATIENT HEALTH QUESTIONNAIRE - PHQ9
10. IF YOU CHECKED OFF ANY PROBLEMS, HOW DIFFICULT HAVE THESE PROBLEMS MADE IT FOR YOU TO DO YOUR WORK, TAKE CARE OF THINGS AT HOME, OR GET ALONG WITH OTHER PEOPLE: NOT DIFFICULT AT ALL
5. POOR APPETITE OR OVEREATING: NOT AT ALL
6. FEELING BAD ABOUT YOURSELF - OR THAT YOU ARE A FAILURE OR HAVE LET YOURSELF OR YOUR FAMILY DOWN: NOT AT ALL
7. TROUBLE CONCENTRATING ON THINGS, SUCH AS READING THE NEWSPAPER OR WATCHING TELEVISION: NOT AT ALL
4. FEELING TIRED OR HAVING LITTLE ENERGY: NOT AT ALL
SUM OF ALL RESPONSES TO PHQ QUESTIONS 1-9: 0
8. MOVING OR SPEAKING SO SLOWLY THAT OTHER PEOPLE COULD HAVE NOTICED. OR THE OPPOSITE, BEING SO FIGETY OR RESTLESS THAT YOU HAVE BEEN MOVING AROUND A LOT MORE THAN USUAL: NOT AT ALL
1. LITTLE INTEREST OR PLEASURE IN DOING THINGS: NOT AT ALL
SUM OF ALL RESPONSES TO PHQ9 QUESTIONS 1 & 2: 0
2. FEELING DOWN, DEPRESSED OR HOPELESS: NOT AT ALL
3. TROUBLE FALLING OR STAYING ASLEEP: NOT AT ALL
SUM OF ALL RESPONSES TO PHQ QUESTIONS 1-9: 0
9. THOUGHTS THAT YOU WOULD BE BETTER OFF DEAD, OR OF HURTING YOURSELF: NOT AT ALL

## 2024-12-20 NOTE — PROGRESS NOTES
The patient (or guardian, if applicable) and other individuals in attendance with the patient were advised that Artificial Intelligence will be utilized during this visit to record, process the conversation to generate a clinical note, and support improvement of the AI technology. The patient (or guardian, if applicable) and other individuals in attendance at the appointment consented to the use of AI, including the recording.        Kyung Arreguin is a 44 y.o. female , id x 2(name and ). Reviewed record, history, and  medications.    Chief Complaint   Patient presents with    Annual Exam    Blood Work     Fasting.    Medication Refill    Hypertension     Pt states her pulse is between 89-90 at home.         Health Maintenance Due   Topic    Varicella vaccine (1 of 2 - 13+ 2-dose series)    HIV screen     Hepatitis B vaccine (1 of 3 - 19+ 3-dose series)    DTaP/Tdap/Td vaccine (1 - Tdap)    Cervical cancer screen     A1C test (Diabetic or Prediabetic)     Breast cancer screen     Depression Monitoring     Flu vaccine (1)    COVID-19 Vaccine ( season)       Wt Readings from Last 1 Encounters:   24 (!) 152.4 kg (336 lb)     BP Readings from Last 3 Encounters:   24 (!) 169/93   23 130/89   22 (!) 143/90     Pulse Readings from Last 1 Encounters:   24 92         Patient is currently accompanied by self & I have received verbal consent from Kyung Arreguin to discuss any/all medical information while he/she is present in the room.    Home BP cuff present today:  no    Home medication list or bottles present today: no  - All medications were reviewed and updated with the patient today in office.    Forms for completion: no    Chest pain/SOB : SOB, , Pt states the SOB has gotten worse.    Surgery within the next month:  no      Depression Screening:  :     Depression: Not at risk (2024)    PHQ-2     PHQ-2 Score: 0          Coordination of Care Questionnaire:  :

## 2024-12-20 NOTE — PATIENT INSTRUCTIONS
You should check your blood pressure at home.    Check first thing in the morning.  You should be relaxed, seated with back supported, feet flat on the floor, arm with cuff resting at heart height.  You should check your blood pressure 1-3 times.  Please write down your blood pressures and bring this record and your blood pressure cuff/machine to your follow-up visit.     I would like for your blood pressure to be less than 130 for the top number and less than 90 for the bottom number.   If your blood pressure is still high after 2-3 weeks, increase lisinopril to 20 mg (2 tablets) daily.  Please follow-up sooner for consistently high blood pressure readings at home.

## 2024-12-20 NOTE — PROGRESS NOTES
Progress Note    she is a 44 y.o. year old female who presents for evaluation of Annual Exam, Blood Work (Fasting.), Medication Refill, and Hypertension (Pt states her pulse is between 89-90 at home. )        Assessment/ Plan:     1. Major depressive disorder, recurrent, moderate (HCC)  -     buPROPion (WELLBUTRIN XL) 150 MG extended release tablet; Take 1 tablet by mouth every morning, Disp-90 tablet, R-1Normal  2. Gastro-esophageal reflux disease without esophagitis  -     pantoprazole (PROTONIX) 20 MG tablet; Take 1 tablet by mouth daily, Disp-90 tablet, R-3Normal  3. Cervical cancer screening  -     [facility], [place]  4. Prediabetes  -     Hemoglobin A1C; Future  5. Pure hypercholesterolemia  -     Comprehensive Metabolic Panel; Future  -     CBC with Auto Differential; Future  -     Lipid Panel; Future  -     TSH; Future  6. Primary hypertension  -     lisinopril (PRINIVIL;ZESTRIL) 10 MG tablet; Take 1 tablet by mouth daily, Disp-90 tablet, R-1Normal  7. Dietary deficiency  -     Vitamin D 25 Hydroxy; Future    Assessment & Plan  1. Irregular menstrual cycles.  She reports that her periods have been off and on during this year, with the last one starting on 11/01/2024. She also feels more tired than usual. A referral to a gynecologist will be provided for a Pap smear, as she is overdue for this test.    2. Fatigue.  She reports feeling more tired than normal and lacking motivation. Blood work will be conducted to assess her overall health, including blood counts, kidney and liver function, A1c, cholesterol panel, thyroid function, and vitamin D levels.    3. Back pain.  She experiences lower back pain that worsens with bending over and sometimes improves with arching her back. The pain radiates to her left leg, causing numbness.    4. Hypertension.  Her blood pressure readings have been consistently elevated, ranging from 140s to 160s. She will resume her lisinopril medication. If her blood

## 2024-12-21 LAB
25(OH)D3 SERPL-MCNC: <9 NG/ML (ref 30–100)
ALBUMIN SERPL-MCNC: 3.3 G/DL (ref 3.5–5)
ALBUMIN/GLOB SERPL: 0.9 (ref 1.1–2.2)
ALP SERPL-CCNC: 86 U/L (ref 45–117)
ALT SERPL-CCNC: 28 U/L (ref 12–78)
ANION GAP SERPL CALC-SCNC: 6 MMOL/L (ref 2–12)
AST SERPL-CCNC: 10 U/L (ref 15–37)
BASOPHILS # BLD: 0 K/UL (ref 0–0.1)
BASOPHILS NFR BLD: 0 % (ref 0–1)
BILIRUB SERPL-MCNC: 0.3 MG/DL (ref 0.2–1)
BUN SERPL-MCNC: 11 MG/DL (ref 6–20)
BUN/CREAT SERPL: 19 (ref 12–20)
CALCIUM SERPL-MCNC: 8.7 MG/DL (ref 8.5–10.1)
CHLORIDE SERPL-SCNC: 106 MMOL/L (ref 97–108)
CHOLEST SERPL-MCNC: 210 MG/DL
CO2 SERPL-SCNC: 28 MMOL/L (ref 21–32)
CREAT SERPL-MCNC: 0.59 MG/DL (ref 0.55–1.02)
DIFFERENTIAL METHOD BLD: NORMAL
EOSINOPHIL # BLD: 0.2 K/UL (ref 0–0.4)
EOSINOPHIL NFR BLD: 2 % (ref 0–7)
ERYTHROCYTE [DISTWIDTH] IN BLOOD BY AUTOMATED COUNT: 13 % (ref 11.5–14.5)
EST. AVERAGE GLUCOSE BLD GHB EST-MCNC: 126 MG/DL
GLOBULIN SER CALC-MCNC: 3.7 G/DL (ref 2–4)
GLUCOSE SERPL-MCNC: 136 MG/DL (ref 65–100)
HBA1C MFR BLD: 6 % (ref 4–5.6)
HCT VFR BLD AUTO: 39.8 % (ref 35–47)
HDLC SERPL-MCNC: 61 MG/DL
HDLC SERPL: 3.4 (ref 0–5)
HGB BLD-MCNC: 12.6 G/DL (ref 11.5–16)
IMM GRANULOCYTES # BLD AUTO: 0 K/UL (ref 0–0.04)
IMM GRANULOCYTES NFR BLD AUTO: 0 % (ref 0–0.5)
LDLC SERPL CALC-MCNC: 127.4 MG/DL (ref 0–100)
LYMPHOCYTES # BLD: 2.3 K/UL (ref 0.8–3.5)
LYMPHOCYTES NFR BLD: 24 % (ref 12–49)
MCH RBC QN AUTO: 29.2 PG (ref 26–34)
MCHC RBC AUTO-ENTMCNC: 31.7 G/DL (ref 30–36.5)
MCV RBC AUTO: 92.3 FL (ref 80–99)
MONOCYTES # BLD: 0.5 K/UL (ref 0–1)
MONOCYTES NFR BLD: 5 % (ref 5–13)
NEUTS SEG # BLD: 6.6 K/UL (ref 1.8–8)
NEUTS SEG NFR BLD: 69 % (ref 32–75)
NRBC # BLD: 0 K/UL (ref 0–0.01)
NRBC BLD-RTO: 0 PER 100 WBC
PLATELET # BLD AUTO: 285 K/UL (ref 150–400)
PMV BLD AUTO: 10.1 FL (ref 8.9–12.9)
POTASSIUM SERPL-SCNC: 4.1 MMOL/L (ref 3.5–5.1)
PROT SERPL-MCNC: 7 G/DL (ref 6.4–8.2)
RBC # BLD AUTO: 4.31 M/UL (ref 3.8–5.2)
SODIUM SERPL-SCNC: 140 MMOL/L (ref 136–145)
TRIGL SERPL-MCNC: 108 MG/DL
TSH SERPL DL<=0.05 MIU/L-ACNC: 1.56 UIU/ML (ref 0.36–3.74)
VLDLC SERPL CALC-MCNC: 21.6 MG/DL
WBC # BLD AUTO: 9.7 K/UL (ref 3.6–11)

## 2024-12-26 ENCOUNTER — PATIENT MESSAGE (OUTPATIENT)
Facility: CLINIC | Age: 44
End: 2024-12-26

## 2024-12-26 DIAGNOSIS — E55.9 HYPOVITAMINOSIS D: Primary | ICD-10-CM

## 2024-12-26 RX ORDER — ERGOCALCIFEROL 1.25 MG/1
50000 CAPSULE, LIQUID FILLED ORAL WEEKLY
Qty: 12 CAPSULE | Refills: 1 | Status: SHIPPED | OUTPATIENT
Start: 2024-12-26

## 2024-12-30 PROBLEM — F33.1 MAJOR DEPRESSIVE DISORDER, RECURRENT, MODERATE (HCC): Status: ACTIVE | Noted: 2022-06-07

## 2024-12-30 PROBLEM — I10 PRIMARY HYPERTENSION: Status: ACTIVE | Noted: 2024-12-30

## 2024-12-30 PROBLEM — K21.9 GASTRO-ESOPHAGEAL REFLUX DISEASE WITHOUT ESOPHAGITIS: Status: ACTIVE | Noted: 2022-06-07

## 2025-01-28 ENCOUNTER — TELEMEDICINE (OUTPATIENT)
Facility: CLINIC | Age: 45
End: 2025-01-28
Payer: MEDICARE

## 2025-01-28 DIAGNOSIS — I10 PRIMARY HYPERTENSION: ICD-10-CM

## 2025-01-28 DIAGNOSIS — F33.1 MAJOR DEPRESSIVE DISORDER, RECURRENT, MODERATE (HCC): ICD-10-CM

## 2025-01-28 PROCEDURE — 99214 OFFICE O/P EST MOD 30 MIN: CPT | Performed by: STUDENT IN AN ORGANIZED HEALTH CARE EDUCATION/TRAINING PROGRAM

## 2025-01-28 RX ORDER — LISINOPRIL 10 MG/1
20 TABLET ORAL DAILY
Qty: 90 TABLET | Refills: 1 | Status: SHIPPED
Start: 2025-01-28

## 2025-01-28 RX ORDER — BUPROPION HYDROCHLORIDE 150 MG/1
300 TABLET ORAL EVERY MORNING
Qty: 90 TABLET | Refills: 1 | Status: SHIPPED
Start: 2025-01-28

## 2025-01-28 SDOH — ECONOMIC STABILITY: FOOD INSECURITY: WITHIN THE PAST 12 MONTHS, THE FOOD YOU BOUGHT JUST DIDN'T LAST AND YOU DIDN'T HAVE MONEY TO GET MORE.: NEVER TRUE

## 2025-01-28 SDOH — ECONOMIC STABILITY: INCOME INSECURITY: IN THE LAST 12 MONTHS, WAS THERE A TIME WHEN YOU WERE NOT ABLE TO PAY THE MORTGAGE OR RENT ON TIME?: NO

## 2025-01-28 SDOH — ECONOMIC STABILITY: FOOD INSECURITY: WITHIN THE PAST 12 MONTHS, YOU WORRIED THAT YOUR FOOD WOULD RUN OUT BEFORE YOU GOT MONEY TO BUY MORE.: NEVER TRUE

## 2025-01-28 SDOH — ECONOMIC STABILITY: TRANSPORTATION INSECURITY
IN THE PAST 12 MONTHS, HAS LACK OF TRANSPORTATION KEPT YOU FROM MEETINGS, WORK, OR FROM GETTING THINGS NEEDED FOR DAILY LIVING?: NO

## 2025-01-28 SDOH — ECONOMIC STABILITY: TRANSPORTATION INSECURITY
IN THE PAST 12 MONTHS, HAS THE LACK OF TRANSPORTATION KEPT YOU FROM MEDICAL APPOINTMENTS OR FROM GETTING MEDICATIONS?: NO

## 2025-01-28 ASSESSMENT — PATIENT HEALTH QUESTIONNAIRE - PHQ9
2. FEELING DOWN, DEPRESSED OR HOPELESS: NOT AT ALL
SUM OF ALL RESPONSES TO PHQ QUESTIONS 1-9: 0
6. FEELING BAD ABOUT YOURSELF - OR THAT YOU ARE A FAILURE OR HAVE LET YOURSELF OR YOUR FAMILY DOWN: NOT AT ALL
SUM OF ALL RESPONSES TO PHQ9 QUESTIONS 1 & 2: 0
SUM OF ALL RESPONSES TO PHQ QUESTIONS 1-9: 0
5. POOR APPETITE OR OVEREATING: NOT AT ALL
SUM OF ALL RESPONSES TO PHQ QUESTIONS 1-9: 0
3. TROUBLE FALLING OR STAYING ASLEEP: NOT AT ALL
10. IF YOU CHECKED OFF ANY PROBLEMS, HOW DIFFICULT HAVE THESE PROBLEMS MADE IT FOR YOU TO DO YOUR WORK, TAKE CARE OF THINGS AT HOME, OR GET ALONG WITH OTHER PEOPLE: NOT DIFFICULT AT ALL
8. MOVING OR SPEAKING SO SLOWLY THAT OTHER PEOPLE COULD HAVE NOTICED. OR THE OPPOSITE, BEING SO FIGETY OR RESTLESS THAT YOU HAVE BEEN MOVING AROUND A LOT MORE THAN USUAL: NOT AT ALL
7. TROUBLE CONCENTRATING ON THINGS, SUCH AS READING THE NEWSPAPER OR WATCHING TELEVISION: NOT AT ALL
4. FEELING TIRED OR HAVING LITTLE ENERGY: NOT AT ALL
1. LITTLE INTEREST OR PLEASURE IN DOING THINGS: NOT AT ALL
9. THOUGHTS THAT YOU WOULD BE BETTER OFF DEAD, OR OF HURTING YOURSELF: NOT AT ALL
SUM OF ALL RESPONSES TO PHQ QUESTIONS 1-9: 0

## 2025-01-28 NOTE — PROGRESS NOTES
Virtual Visit Progress Note    she is a 44 y.o. year old female who presents for evaluation Depression (1-2 month follow up ) and Blood Pressure Check (See log in messages.)        Assessment/ Plan:     Assessment & Plan  1. Depression.  Her mental health appears to be improving, with an increase in positive moments despite recent grief. She will augment her Wellbutrin dosage to 300 mg daily by taking two 150 mg tablets. If the 300 mg daily dose is too much, she can alternate between 300 mg and 150 mg every other day. She is advised to communicate any side effects or concerns via Reactivity.    2. Hypertension.  Her blood pressure remains above the target range. She will increase her lisinopril dosage to 20 mg daily. A follow-up virtual visit is scheduled for 3 weeks from now to assess the effectiveness of this adjustment. She is instructed to contact the office if she requires any refills prior to the scheduled visit.    Follow-up  The patient will follow up on 02/18/2025 at 3:00 PM.       1. Major depressive disorder, recurrent, moderate (HCC)  -     buPROPion (WELLBUTRIN XL) 150 MG extended release tablet; Take 2 tablets by mouth every morning, Disp-90 tablet, R-1Adjust Sig  2. Primary hypertension  -     lisinopril (PRINIVIL;ZESTRIL) 10 MG tablet; Take 2 tablets by mouth daily, Disp-90 tablet, R-1Adjust Sig         I have discussed the diagnosis with the patient and the intended plan as seen in the above orders.    Medication Side Effects and Warnings were discussed with patient  The patient was instructed to access after-visit summary via [employer] and questions were answered concerning future plans.    Patient conveyed understanding of plan.       Current Outpatient Medications   Medication Sig    lisinopril (PRINIVIL;ZESTRIL) 10 MG tablet Take 2 tablets by mouth daily    buPROPion (WELLBUTRIN XL) 150 MG extended release tablet Take 2 tablets by mouth every morning    metFORMIN (GLUCOPHAGE-XR) 500 MG extended

## 2025-01-28 NOTE — PROGRESS NOTES
The patient (or guardian, if applicable) and other individuals in attendance with the patient were advised that Artificial Intelligence will be utilized during this visit to record, process the conversation to generate a clinical note, and support improvement of the AI technology. The patient (or guardian, if applicable) and other individuals in attendance at the appointment consented to the use of AI, including the recording.      Patient confirms they are located here in [place] for this virtual visit today.    Kyung Arreguin is a 44 y.o. female , id x 2(name and ). Reviewed record, history, and  medications.    Chief Complaint   Patient presents with    Depression     1-2 month follow up         Health Maintenance Due   Topic    Varicella vaccine (1 of 2 - 13+ 2-dose series)    HIV screen     Hepatitis B vaccine (1 of 3 - 19+ 3-dose series)    DTaP/Tdap/Td vaccine (1 - Tdap)    Cervical cancer screen     Breast cancer screen     Flu vaccine (1)    COVID-19 Vaccine ( season)    Annual Wellness Visit (Medicare Advantage)        Wt Readings from Last 1 Encounters:   24 (!) 152.4 kg (336 lb)     BP Readings from Last 3 Encounters:   24 (!) 169/93   23 130/89   22 (!) 143/90     Pulse Readings from Last 1 Encounters:   24 92     Surgery within the next month: no    Forms for completion: no    Chest pain/SOB: no    Depression Screening:  :     Depression: Not at risk (2024)    PHQ-2     PHQ-2 Score: 0        Coordination of Care Questionnaire:  :     \"Have you been to the ER, urgent care clinic since your last visit?  Hospitalized since your last visit?\"    NO    “Have you seen or consulted any other health care providers outside of [facility] since your last visit?”    NO    Have you had a mammogram?”   NO    Date of last Mammogram: 2021      “Have you had a pap smear?”    NO    No cervical cancer screening on file     Click Here for Release

## 2025-02-01 ENCOUNTER — PATIENT MESSAGE (OUTPATIENT)
Facility: CLINIC | Age: 45
End: 2025-02-01

## 2025-02-01 DIAGNOSIS — F33.1 MAJOR DEPRESSIVE DISORDER, RECURRENT, MODERATE (HCC): ICD-10-CM

## 2025-02-01 DIAGNOSIS — I10 PRIMARY HYPERTENSION: ICD-10-CM

## 2025-02-03 RX ORDER — BUPROPION HYDROCHLORIDE 150 MG/1
300 TABLET ORAL EVERY MORNING
Qty: 90 TABLET | Refills: 1 | Status: SHIPPED | OUTPATIENT
Start: 2025-02-03 | End: 2025-02-03 | Stop reason: SDUPTHER

## 2025-02-03 RX ORDER — BUPROPION HYDROCHLORIDE 300 MG/1
300 TABLET ORAL EVERY MORNING
Qty: 90 TABLET | Refills: 1 | Status: SHIPPED | OUTPATIENT
Start: 2025-02-03

## 2025-02-03 RX ORDER — LISINOPRIL 10 MG/1
20 TABLET ORAL DAILY
Qty: 90 TABLET | Refills: 1 | Status: SHIPPED | OUTPATIENT
Start: 2025-02-03 | End: 2025-02-03 | Stop reason: SDUPTHER

## 2025-02-03 RX ORDER — LISINOPRIL 20 MG/1
20 TABLET ORAL DAILY
Qty: 90 TABLET | Refills: 1 | Status: SHIPPED | OUTPATIENT
Start: 2025-02-03

## 2025-02-18 ENCOUNTER — TELEMEDICINE (OUTPATIENT)
Facility: CLINIC | Age: 45
End: 2025-02-18
Payer: MEDICARE

## 2025-02-18 DIAGNOSIS — I10 PRIMARY HYPERTENSION: ICD-10-CM

## 2025-02-18 DIAGNOSIS — R73.03 PREDIABETES: ICD-10-CM

## 2025-02-18 PROCEDURE — 99214 OFFICE O/P EST MOD 30 MIN: CPT | Performed by: STUDENT IN AN ORGANIZED HEALTH CARE EDUCATION/TRAINING PROGRAM

## 2025-02-18 RX ORDER — LISINOPRIL 40 MG/1
40 TABLET ORAL DAILY
Qty: 90 TABLET | Refills: 1 | Status: SHIPPED | OUTPATIENT
Start: 2025-02-18

## 2025-02-18 NOTE — PROGRESS NOTES
The patient (or guardian, if applicable) and other individuals in attendance with the patient were advised that Artificial Intelligence will be utilized during this visit to record, process the conversation to generate a clinical note, and support improvement of the AI technology. The patient (or guardian, if applicable) and other individuals in attendance at the appointment consented to the use of AI, including the recording.      Patient confirms they are located here in [place] for this virtual visit today.    Kyung Arreguin is a 44 y.o. female , id x 2(name and ). Reviewed record, history, and  medications.    Chief Complaint   Patient presents with    Blood Pressure Check    Mood Swings        Health Maintenance Due   Topic    Varicella vaccine (1 of 2 - 13+ 2-dose series)    HIV screen     Hepatitis B vaccine (1 of 3 - 19+ 3-dose series)    DTaP/Tdap/Td vaccine (1 - Tdap)    Cervical cancer screen     Breast cancer screen     Flu vaccine (1)    COVID-19 Vaccine ( season)    Annual Wellness Visit (Medicare Advantage)        Wt Readings from Last 1 Encounters:   24 (!) 152.4 kg (336 lb)     BP Readings from Last 3 Encounters:   24 (!) 169/93   23 130/89   22 (!) 143/90     Pulse Readings from Last 1 Encounters:   24 92       Surgery within the next month: no    Forms for completion: no    Chest pain/SOB: no    Depression Screening:  :     Depression: Not at risk (2025)    PHQ-2     PHQ-2 Score: 0          Coordination of Care Questionnaire:  :     \"Have you been to the ER, urgent care clinic since your last visit?  Hospitalized since your last visit?\"    NO    “Have you seen or consulted any other health care providers outside of [facility] since your last visit?”    NO    Have you had a mammogram?”   NO    Date of last Mammogram: 2021      “Have you had a pap smear?”    NO    No cervical cancer screening on file       Click Here for

## 2025-02-18 NOTE — PATIENT INSTRUCTIONS
Get started!  Set small weekly goals to achieve, feel good about, and build on.  Write them down!  :)    Track what you are eating and drinking - everything!  :)  Use an shahnaz such as Listen Up.  Aim to keep your carbohydrates low, under 100, then 80, then 60 grams daily.  Keto diet would generally be under 20 grams daily.  Check out diabetesfoodhub.org for good recipes.  Minimize processed food, fast food, and fried food in your diet.  Do your best to primarily drink water.  When eating, do not multitask since this will lead to overeating.  Pay attention to food and drink with all your senses.  Try to eat slowly.  When you eat out, eat just half of it and wait 10 minutes before eating any more if you are hungry.  Use a small plate, it helps your brain's perception of portions.    Exercise recommendations  150 minutes of moderate intensity cardio exercise in the week  3 days of total body strength training  Work on stretching/flexibility as well.  It's great to get outside!  But you can also check out youtube for fun videos and workouts.  I like yoga with hugo

## 2025-02-18 NOTE — PROGRESS NOTES
Virtual Visit Progress Note    she is a 44 y.o. year old female who presents for evaluation Blood Pressure Check and Mood Swings        Assessment/ Plan:     Assessment & Plan  1. Hypertension.  Her blood pressure has been stable in the 130s, with today's reading at 131/77. The dosage of lisinopril will be increased from 20 mg to 40 mg to aim for a systolic blood pressure below 130. She is advised to monitor for any symptoms of hypotension and report if they occur.    2. Mood disorder.  Her mood has improved, and she feels less tired, which may be due to the combination of vitamin D supplementation and an increased dose of bupropion. She will continue her current regimen of Wellbutrin 300 mg. If she feels the need for a dosage adjustment, an additional 150 mg can be added to reach the maximum dose of 450 mg. She is encouraged to continue her vitamin D supplementation until her well visit in May 2025, at which point her blood work will be updated.    3. Weight management.  Her weight has shown a slight decrease. She is advised to adopt a low-carbohydrate diet and incorporate small lifestyle modifications such as walking and dietary changes. Detailed recommendations will be provided in her after-visit summary.    Follow-up  The patient is scheduled for a follow-up visit in May 2025.       1. Primary hypertension  -     lisinopril (PRINIVIL;ZESTRIL) 40 MG tablet; Take 1 tablet by mouth daily, Disp-90 tablet, R-1Normal  2. Prediabetes         I have discussed the diagnosis with the patient and the intended plan as seen in the above orders.    Medication Side Effects and Warnings were discussed with patient  The patient was instructed to access after-visit summary via [employer] and questions were answered concerning future plans.    Patient conveyed understanding of plan.       Current Outpatient Medications   Medication Sig    lisinopril (PRINIVIL;ZESTRIL) 40 MG tablet Take 1 tablet by mouth daily    buPROPion

## 2025-02-19 ENCOUNTER — COMMUNITY OUTREACH (OUTPATIENT)
Facility: CLINIC | Age: 45
End: 2025-02-19

## 2025-05-04 SDOH — HEALTH STABILITY: PHYSICAL HEALTH: ON AVERAGE, HOW MANY MINUTES DO YOU ENGAGE IN EXERCISE AT THIS LEVEL?: 30 MIN

## 2025-05-04 SDOH — HEALTH STABILITY: PHYSICAL HEALTH: ON AVERAGE, HOW MANY DAYS PER WEEK DO YOU ENGAGE IN MODERATE TO STRENUOUS EXERCISE (LIKE A BRISK WALK)?: 3 DAYS

## 2025-05-04 ASSESSMENT — LIFESTYLE VARIABLES
HOW OFTEN DO YOU HAVE A DRINK CONTAINING ALCOHOL: 1
HOW MANY STANDARD DRINKS CONTAINING ALCOHOL DO YOU HAVE ON A TYPICAL DAY: PATIENT DOES NOT DRINK
HOW OFTEN DO YOU HAVE SIX OR MORE DRINKS ON ONE OCCASION: 1
HOW OFTEN DO YOU HAVE A DRINK CONTAINING ALCOHOL: NEVER
HOW MANY STANDARD DRINKS CONTAINING ALCOHOL DO YOU HAVE ON A TYPICAL DAY: 0

## 2025-05-04 ASSESSMENT — PATIENT HEALTH QUESTIONNAIRE - PHQ9
SUM OF ALL RESPONSES TO PHQ QUESTIONS 1-9: 12
3. TROUBLE FALLING OR STAYING ASLEEP: MORE THAN HALF THE DAYS
2. FEELING DOWN, DEPRESSED OR HOPELESS: SEVERAL DAYS
9. THOUGHTS THAT YOU WOULD BE BETTER OFF DEAD, OR OF HURTING YOURSELF: NOT AT ALL
SUM OF ALL RESPONSES TO PHQ QUESTIONS 1-9: 12
SUM OF ALL RESPONSES TO PHQ QUESTIONS 1-9: 12
6. FEELING BAD ABOUT YOURSELF - OR THAT YOU ARE A FAILURE OR HAVE LET YOURSELF OR YOUR FAMILY DOWN: MORE THAN HALF THE DAYS
7. TROUBLE CONCENTRATING ON THINGS, SUCH AS READING THE NEWSPAPER OR WATCHING TELEVISION: SEVERAL DAYS
4. FEELING TIRED OR HAVING LITTLE ENERGY: SEVERAL DAYS
SUM OF ALL RESPONSES TO PHQ QUESTIONS 1-9: 12
5. POOR APPETITE OR OVEREATING: MORE THAN HALF THE DAYS
10. IF YOU CHECKED OFF ANY PROBLEMS, HOW DIFFICULT HAVE THESE PROBLEMS MADE IT FOR YOU TO DO YOUR WORK, TAKE CARE OF THINGS AT HOME, OR GET ALONG WITH OTHER PEOPLE: SOMEWHAT DIFFICULT
1. LITTLE INTEREST OR PLEASURE IN DOING THINGS: MORE THAN HALF THE DAYS
8. MOVING OR SPEAKING SO SLOWLY THAT OTHER PEOPLE COULD HAVE NOTICED. OR THE OPPOSITE, BEING SO FIGETY OR RESTLESS THAT YOU HAVE BEEN MOVING AROUND A LOT MORE THAN USUAL: SEVERAL DAYS

## 2025-05-07 ENCOUNTER — OFFICE VISIT (OUTPATIENT)
Facility: CLINIC | Age: 45
End: 2025-05-07

## 2025-05-07 VITALS
SYSTOLIC BLOOD PRESSURE: 133 MMHG | OXYGEN SATURATION: 95 % | DIASTOLIC BLOOD PRESSURE: 79 MMHG | BODY MASS INDEX: 57.52 KG/M2 | HEART RATE: 101 BPM | HEIGHT: 60 IN | WEIGHT: 293 LBS | RESPIRATION RATE: 18 BRPM

## 2025-05-07 DIAGNOSIS — F33.1 MAJOR DEPRESSIVE DISORDER, RECURRENT, MODERATE (HCC): ICD-10-CM

## 2025-05-07 DIAGNOSIS — E66.01 MORBID OBESITY DUE TO EXCESS CALORIES (HCC): ICD-10-CM

## 2025-05-07 DIAGNOSIS — I10 PRIMARY HYPERTENSION: ICD-10-CM

## 2025-05-07 DIAGNOSIS — M54.42 CHRONIC RIGHT-SIDED LOW BACK PAIN WITH LEFT-SIDED SCIATICA: ICD-10-CM

## 2025-05-07 DIAGNOSIS — G89.29 CHRONIC RIGHT-SIDED LOW BACK PAIN WITH LEFT-SIDED SCIATICA: ICD-10-CM

## 2025-05-07 DIAGNOSIS — E55.9 HYPOVITAMINOSIS D: ICD-10-CM

## 2025-05-07 DIAGNOSIS — Z00.00 MEDICARE ANNUAL WELLNESS VISIT, SUBSEQUENT: Primary | ICD-10-CM

## 2025-05-07 DIAGNOSIS — K21.9 GASTRO-ESOPHAGEAL REFLUX DISEASE WITHOUT ESOPHAGITIS: ICD-10-CM

## 2025-05-07 DIAGNOSIS — E78.00 PURE HYPERCHOLESTEROLEMIA: ICD-10-CM

## 2025-05-07 DIAGNOSIS — R73.03 PREDIABETES: ICD-10-CM

## 2025-05-07 RX ORDER — BUPROPION HYDROCHLORIDE 300 MG/1
300 TABLET ORAL EVERY MORNING
Qty: 90 TABLET | Refills: 1 | Status: SHIPPED | OUTPATIENT
Start: 2025-05-07

## 2025-05-07 RX ORDER — LISINOPRIL 40 MG/1
40 TABLET ORAL DAILY
Qty: 90 TABLET | Refills: 1 | Status: SHIPPED | OUTPATIENT
Start: 2025-05-07

## 2025-05-07 RX ORDER — METFORMIN HYDROCHLORIDE 500 MG/1
500 TABLET, EXTENDED RELEASE ORAL
Qty: 90 TABLET | Refills: 1 | Status: SHIPPED | OUTPATIENT
Start: 2025-05-07

## 2025-05-07 RX ORDER — BUPROPION HYDROCHLORIDE 150 MG/1
150 TABLET ORAL EVERY MORNING
Qty: 90 TABLET | Refills: 1 | Status: SHIPPED | OUTPATIENT
Start: 2025-05-07

## 2025-05-07 NOTE — PATIENT INSTRUCTIONS
Learning About Mindfulness for Stress  What are mindfulness and stress?     Stress is your body's response to a hard situation. Your body can have a physical, emotional, or mental response. A lot of things can cause stress. You may feel stress when you go on a job interview, take a test, or run a race. This kind of short-term stress is normal and even useful. It can help you if you need to work hard or react quickly.  Stress also can last a long time. Long-term stress is caused by stressful situations or events. Examples of long-term stress include long-term health problems, ongoing problems at work, and conflicts in your family. Long-term stress can harm your health.  Mindfulness is a focus only on things happening in the present moment. It's a process of purposefully paying attention to and being aware of your surroundings, your emotions, your thoughts, and how your body feels. You are aware of these things, but you aren't judging these experiences as \"good\" or \"bad.\" Mindfulness can help you learn to calm your mind and body to help you cope with illness, pain, and stress.  How does mindfulness help to relieve stress?  Mindfulness can help quiet your mind and relax your body. Studies show that it can help some people sleep better, feel less anxious, and bring their blood pressure down. And it's been shown to help some people live and cope better with certain health problems like heart disease, depression, chronic pain, and cancer.  How do you practice mindfulness?  To be mindful is to pay attention, to be present, and to be accepting. Like any new skill or habit, being mindful can take practice.  When you're mindful, you do just one thing and you pay close attention to that one thing. For example, you may sit quietly and notice your emotions or how your food tastes and smells.  When you're present, you focus on the things that are happening right now. You let go of your thoughts about the past and the future. 
No

## 2025-05-07 NOTE — PROGRESS NOTES
Medicare Annual Wellness Visit    Kyung Arreguin is here for Medicare AWV    Assessment & Plan  1. Cough.  - The patient has been experiencing a cough for a couple of months, producing a little bit of mucus and associated with postnasal drainage and occasional runny nose.  - The cough is unlikely to be a side effect of lisinopril due to the presence of mucus. Examination revealed nasal congestion indicative of allergies.  - A list of over-the-counter medications will be provided. If these prove insufficient, prescription Singulair may be considered.    2. Depression.  - The patient reports occasional feelings of failure and wanting to sleep all day, occurring only a couple of times over the past few months.  - She is currently on Wellbutrin 300 mg daily and has not experienced any side effects.  - The dosage of Wellbutrin will be increased to 450 mg daily by adding a second tablet of 150 mg. Prescription Drug Monitoring Program was reviewed.  - A prescription will be sent to pharmacy.    3. Back pain.  - The patient experiences lower back pain after walking for 10-15 minutes, which sometimes radiates to the left leg.  - There is no history of back injuries or accidents. She occasionally takes Tylenol for the pain, which resolves after a while.  - If the pain does not improve with rest and gentle exercises, physical therapy is recommended.  - A referral for physical therapy will be provided.    4. Weight management.  - The patient is attempting to reduce carbohydrate intake but has not been tracking her consumption.  - She is interested in both medical and surgical weight management options.  - Contact information for the weight management team at [facility] will be provided.  - Physical therapy referral will also be provided to help with increasing mobility and developing an exercise program.    5. Blood pressure management.  - The patient is advised to monitor her blood pressure at home using a properly

## 2025-05-07 NOTE — PROGRESS NOTES
The patient (or guardian, if applicable) and other individuals in attendance with the patient were advised that Artificial Intelligence will be utilized during this visit to record, process the conversation to generate a clinical note, and support improvement of the AI technology. The patient (or guardian, if applicable) and other individuals in attendance at the appointment consented to the use of AI, including the recording.        Kyung Arreguin is a 44 y.o. female , id x 2(name and ). Reviewed record, history, and  medications.    Chief Complaint   Patient presents with    Medicare AWV        Health Maintenance Due   Topic    Varicella vaccine (1 of 2 - 13+ 2-dose series)    HIV screen     Hepatitis B vaccine (1 of 3 - 19+ 3-dose series)    DTaP/Tdap/Td vaccine (1 - Tdap)    Cervical cancer screen     Breast cancer screen     COVID-19 Vaccine ( season)    Annual Wellness Visit (Medicare Advantage)        Wt Readings from Last 1 Encounters:   25 (!) 148.8 kg (328 lb)     BP Readings from Last 3 Encounters:   25 133/79   24 (!) 169/93   23 130/89     Pulse Readings from Last 1 Encounters:   25 (!) 101         Patient is currently accompanied by self & I have received verbal consent from Kyung Arreguin to discuss any/all medical information while he/she is present in the room.    All medications were reviewed and updated with the patient today in office.    Forms for completion: no    Chest pain/SOB no    Surgery within the next month:  no      Depression Screening:  :     Depression: At risk (2025)    PHQ-2     PHQ-2 Score: 12          Coordination of Care Questionnaire:  :     \"Have you been to the ER, urgent care clinic since your last visit?  Hospitalized since your last visit?\"    NO    “Have you seen or consulted any other health care providers outside of [facility] since your last visit?”    NO    Have you had a mammogram?”

## 2025-05-08 ENCOUNTER — TELEPHONE (OUTPATIENT)
Age: 45
End: 2025-05-08

## 2025-05-08 LAB
25(OH)D3 SERPL-MCNC: 42.9 NG/ML (ref 30–100)
ALBUMIN SERPL-MCNC: 3.8 G/DL (ref 3.5–5)
ALBUMIN/GLOB SERPL: 1 (ref 1.1–2.2)
ALP SERPL-CCNC: 98 U/L (ref 45–117)
ALT SERPL-CCNC: 36 U/L (ref 12–78)
ANION GAP SERPL CALC-SCNC: 4 MMOL/L (ref 2–12)
AST SERPL-CCNC: 16 U/L (ref 15–37)
BILIRUB SERPL-MCNC: 0.4 MG/DL (ref 0.2–1)
BUN SERPL-MCNC: 12 MG/DL (ref 6–20)
BUN/CREAT SERPL: 18 (ref 12–20)
CALCIUM SERPL-MCNC: 9.1 MG/DL (ref 8.5–10.1)
CHLORIDE SERPL-SCNC: 103 MMOL/L (ref 97–108)
CHOLEST SERPL-MCNC: 202 MG/DL
CO2 SERPL-SCNC: 28 MMOL/L (ref 21–32)
CREAT SERPL-MCNC: 0.67 MG/DL (ref 0.55–1.02)
ERYTHROCYTE [DISTWIDTH] IN BLOOD BY AUTOMATED COUNT: 13.3 % (ref 11.5–14.5)
EST. AVERAGE GLUCOSE BLD GHB EST-MCNC: 126 MG/DL
GLOBULIN SER CALC-MCNC: 3.8 G/DL (ref 2–4)
GLUCOSE SERPL-MCNC: 88 MG/DL (ref 65–100)
HBA1C MFR BLD: 6 % (ref 4–5.6)
HCT VFR BLD AUTO: 41.9 % (ref 35–47)
HDLC SERPL-MCNC: 65 MG/DL
HDLC SERPL: 3.1 (ref 0–5)
HGB BLD-MCNC: 13.2 G/DL (ref 11.5–16)
LDLC SERPL CALC-MCNC: 111.4 MG/DL (ref 0–100)
MCH RBC QN AUTO: 29.1 PG (ref 26–34)
MCHC RBC AUTO-ENTMCNC: 31.5 G/DL (ref 30–36.5)
MCV RBC AUTO: 92.3 FL (ref 80–99)
NRBC # BLD: 0 K/UL (ref 0–0.01)
NRBC BLD-RTO: 0 PER 100 WBC
PLATELET # BLD AUTO: 366 K/UL (ref 150–400)
PMV BLD AUTO: 10.1 FL (ref 8.9–12.9)
POTASSIUM SERPL-SCNC: 4.1 MMOL/L (ref 3.5–5.1)
PROT SERPL-MCNC: 7.6 G/DL (ref 6.4–8.2)
RBC # BLD AUTO: 4.54 M/UL (ref 3.8–5.2)
SODIUM SERPL-SCNC: 135 MMOL/L (ref 136–145)
TRIGL SERPL-MCNC: 128 MG/DL
TSH SERPL DL<=0.05 MIU/L-ACNC: 1.82 UIU/ML (ref 0.36–3.74)
VLDLC SERPL CALC-MCNC: 25.6 MG/DL
WBC # BLD AUTO: 11.8 K/UL (ref 3.6–11)

## 2025-05-08 NOTE — TELEPHONE ENCOUNTER
Called patient regarding referral to our [facility]. Patient did not answer so I left a voicemail with our contact information.

## 2025-05-08 NOTE — TELEPHONE ENCOUNTER
Patient has been sent the link for our pre-recorded [facility] Medical Weight Loss Orientation. Patient is required to register, view the recording, call insurance to verify benefits, then send an email to the  to schedule a consultation.

## 2025-05-11 ENCOUNTER — RESULTS FOLLOW-UP (OUTPATIENT)
Facility: CLINIC | Age: 45
End: 2025-05-11

## 2025-05-21 ENCOUNTER — TELEPHONE (OUTPATIENT)
Age: 45
End: 2025-05-21

## 2025-05-21 NOTE — TELEPHONE ENCOUNTER
Called patient regarding referral to our [facility]. Patient did not answer so I left a voicemail with our contact information.

## 2025-05-21 NOTE — TELEPHONE ENCOUNTER
Called patient regarding referral to our [facility]. Patient did not answer so I left a voicemail with our contact information.

## 2025-06-04 ENCOUNTER — TELEPHONE (OUTPATIENT)
Age: 45
End: 2025-06-04

## 2025-06-04 NOTE — TELEPHONE ENCOUNTER
Patient has been sent the link for our pre-recorded [facility] Medical Weight Loss Orientation. Patient is required to register, view the recording, call insurance to verify benefits, then send an email to the  to schedule a consultation.

## 2025-06-12 ENCOUNTER — TELEPHONE (OUTPATIENT)
Facility: CLINIC | Age: 45
End: 2025-06-12

## 2025-06-12 NOTE — TELEPHONE ENCOUNTER
We received a fax refill request for Kyung Arreguin.  Please escribe vitamin D (ERGOCALCIFEROL) 1.25 MG (15925 UT) CAPS capsule  to their pharmacy.  The pharmacy is correct in the chart and they are requesting a ? day supply.       Topical Clindamycin Counseling: Patient counseled that this medication may cause skin irritation or allergic reactions.  In the event of skin irritation, the patient was advised to reduce the amount of the drug applied or use it less frequently.   The patient verbalized understanding of the proper use and possible adverse effects of clindamycin.  All of the patient's questions and concerns were addressed.

## 2025-06-13 DIAGNOSIS — E55.9 HYPOVITAMINOSIS D: ICD-10-CM

## 2025-06-13 RX ORDER — ERGOCALCIFEROL 1.25 MG/1
50000 CAPSULE, LIQUID FILLED ORAL WEEKLY
Qty: 12 CAPSULE | Refills: 1 | Status: SHIPPED | OUTPATIENT
Start: 2025-06-13

## (undated) DEVICE — Device

## (undated) DEVICE — SOLIDIFIER FLD 2OZ 1500CC N DISINF IN BTL DISP SAFESORB

## (undated) DEVICE — ADULT SPO2 SENSOR,REMANUFACTURED,REPROCESSED DEVICE FOR SINGLE USE; REPROCESSED BY COVIDIEN LLC: Brand: NELLCOR

## (undated) DEVICE — FCPS RMFG RAD JAW 4LC 240CM -- LAWSON OEM ITEM 185493

## (undated) DEVICE — 3M™ CUROS™ DISINFECTING CAP FOR NEEDLELESS CONNECTORS 270/CARTON 20 CARTONS/CASE CFF1-270: Brand: CUROS™

## (undated) DEVICE — ESOPHAGEAL BALLOON DILATATION CATHETER: Brand: CRE FIXED WIRE

## (undated) DEVICE — BAG SPEC BIOHZRD 10 X 10 IN --

## (undated) DEVICE — CUFF RMFG BP INF SZ 11 DISP -- LAWSON OEM ITEM 238915

## (undated) DEVICE — SYR ASSEMB INFL BLLN 60ML --

## (undated) DEVICE — BITEBLOCK ENDOSCP 60FR MAXI WHT POLYETH STURDY W/ VELC WVN

## (undated) DEVICE — 1200 GUARD II KIT W/5MM TUBE W/O VAC TUBE: Brand: GUARDIAN

## (undated) DEVICE — ELECTRODE,RADIOTRANSLUCENT,FOAM,3PK: Brand: MEDLINE

## (undated) DEVICE — KIT COLON W/ 1.1OZ LUB AND 2 END

## (undated) DEVICE — CANN NASAL O2 CAPNOGRAPHY AD -- FILTERLINE

## (undated) DEVICE — CONTAINER SPEC 20 ML LID NEUT BUFF FORMALIN 10 % POLYPR STS

## (undated) DEVICE — SET GRAV CK VLV NEEDLESS ST 3 GANGED 4WAY STPCOCK HI FLO 10

## (undated) DEVICE — BAG BELONG PT PERS CLEAR HANDL

## (undated) DEVICE — CATH IV AUTOGRD BC BLU 22GA 25 -- INSYTE